# Patient Record
Sex: MALE | Race: WHITE | Employment: FULL TIME | URBAN - METROPOLITAN AREA
[De-identification: names, ages, dates, MRNs, and addresses within clinical notes are randomized per-mention and may not be internally consistent; named-entity substitution may affect disease eponyms.]

---

## 2019-02-26 LAB
ALBUMIN SERPL-MCNC: 4.4 G/DL
ALP BLD-CCNC: 94 U/L
ALT SERPL-CCNC: 35 U/L
ANION GAP SERPL CALCULATED.3IONS-SCNC: ABNORMAL MMOL/L
AST SERPL-CCNC: 18 U/L
BASOPHILS ABSOLUTE: 20 /ΜL
BASOPHILS RELATIVE PERCENT: 0 %
BILIRUB SERPL-MCNC: 0.7 MG/DL (ref 0.1–1.4)
BUN BLDV-MCNC: 12 MG/DL
CALCIUM SERPL-MCNC: 9.5 MG/DL
CHLORIDE BLD-SCNC: 106 MMOL/L
CHOLESTEROL, TOTAL: 208 MG/DL
CHOLESTEROL/HDL RATIO: 5.3
CO2: 29 MMOL/L
CREAT SERPL-MCNC: 0.85 MG/DL
EOSINOPHILS ABSOLUTE: 100 /ΜL
EOSINOPHILS RELATIVE PERCENT: 2 %
GFR CALCULATED: 125
GLUCOSE BLD-MCNC: 105 MG/DL
HCT VFR BLD CALC: 43.7 % (ref 41–53)
HDLC SERPL-MCNC: 39 MG/DL (ref 35–70)
HEMOGLOBIN: 14.6 G/DL (ref 13.5–17.5)
LDL CHOLESTEROL CALCULATED: 138 MG/DL (ref 0–160)
LYMPHOCYTES ABSOLUTE: 2050 /ΜL
LYMPHOCYTES RELATIVE PERCENT: 41 %
MCH RBC QN AUTO: 29.7 PG
MCHC RBC AUTO-ENTMCNC: 33.4 G/DL
MCV RBC AUTO: 88.9 FL
MONOCYTES ABSOLUTE: 430 /ΜL
MONOCYTES RELATIVE PERCENT: 9 %
NEUTROPHILS ABSOLUTE: 2340 /ΜL
NEUTROPHILS RELATIVE PERCENT: 47 %
PLATELET # BLD: 328 K/ΜL
PMV BLD AUTO: 8.3 FL
POTASSIUM SERPL-SCNC: 4.2 MMOL/L
RBC # BLD: 4.91 10^6/ΜL
SODIUM BLD-SCNC: 140 MMOL/L
TOTAL PROTEIN: 7
TRIGL SERPL-MCNC: 177 MG/DL
TSH SERPL DL<=0.05 MIU/L-ACNC: 0.76 UIU/ML
VLDLC SERPL CALC-MCNC: 168 MG/DL
WBC # BLD: 4.9 10^3/ML

## 2019-05-21 ENCOUNTER — TELEPHONE (OUTPATIENT)
Dept: PRIMARY CARE CLINIC | Age: 24
End: 2019-05-21

## 2019-05-21 NOTE — TELEPHONE ENCOUNTER
Mom called stating Vinckillian epi pen rx is  and would like it called into the pharmacy due to him being outside working.  cb number: [phone]

## 2019-05-24 RX ORDER — EPINEPHRINE 0.3 MG/.3ML
INJECTION SUBCUTANEOUS
Qty: 1 EACH | Refills: 1 | Status: SHIPPED
Start: 2019-05-24 | End: 2020-04-29 | Stop reason: SDUPTHER

## 2019-10-07 ENCOUNTER — OFFICE VISIT (OUTPATIENT)
Dept: PRIMARY CARE CLINIC | Age: 24
End: 2019-10-07
Payer: COMMERCIAL

## 2019-10-07 VITALS
OXYGEN SATURATION: 98 % | BODY MASS INDEX: 33.88 KG/M2 | WEIGHT: 242 LBS | HEIGHT: 71 IN | DIASTOLIC BLOOD PRESSURE: 80 MMHG | SYSTOLIC BLOOD PRESSURE: 128 MMHG | HEART RATE: 84 BPM

## 2019-10-07 DIAGNOSIS — E78.2 MIXED HYPERLIPIDEMIA: ICD-10-CM

## 2019-10-07 DIAGNOSIS — F34.1 DYSTHYMIA: Primary | ICD-10-CM

## 2019-10-07 DIAGNOSIS — Z00.00 HEALTH MAINTENANCE EXAMINATION: ICD-10-CM

## 2019-10-07 PROCEDURE — 90471 IMMUNIZATION ADMIN: CPT | Performed by: FAMILY MEDICINE

## 2019-10-07 PROCEDURE — G8427 DOCREV CUR MEDS BY ELIG CLIN: HCPCS | Performed by: FAMILY MEDICINE

## 2019-10-07 PROCEDURE — 99214 OFFICE O/P EST MOD 30 MIN: CPT | Performed by: FAMILY MEDICINE

## 2019-10-07 PROCEDURE — 90651 9VHPV VACCINE 2/3 DOSE IM: CPT | Performed by: FAMILY MEDICINE

## 2019-10-07 PROCEDURE — G8484 FLU IMMUNIZE NO ADMIN: HCPCS | Performed by: FAMILY MEDICINE

## 2019-10-07 PROCEDURE — 1036F TOBACCO NON-USER: CPT | Performed by: FAMILY MEDICINE

## 2019-10-07 PROCEDURE — G8417 CALC BMI ABV UP PARAM F/U: HCPCS | Performed by: FAMILY MEDICINE

## 2019-10-07 ASSESSMENT — PATIENT HEALTH QUESTIONNAIRE - PHQ9: DEPRESSION UNABLE TO ASSESS: PT REFUSES

## 2019-11-06 PROBLEM — Z00.00 HEALTH MAINTENANCE EXAMINATION: Status: RESOLVED | Noted: 2019-10-07 | Resolved: 2019-11-06

## 2020-03-31 RX ORDER — CETIRIZINE HYDROCHLORIDE 10 MG/1
10 TABLET ORAL DAILY PRN
Qty: 30 TABLET | Refills: 3 | Status: SHIPPED
Start: 2020-03-31 | End: 2020-09-18 | Stop reason: SDUPTHER

## 2020-03-31 RX ORDER — CETIRIZINE HYDROCHLORIDE 10 MG/1
10 TABLET ORAL DAILY
COMMUNITY
End: 2020-03-31 | Stop reason: SDUPTHER

## 2020-04-29 RX ORDER — EPINEPHRINE 0.3 MG/.3ML
INJECTION SUBCUTANEOUS
Qty: 1 EACH | Refills: 1 | Status: SHIPPED
Start: 2020-04-29 | End: 2022-04-14 | Stop reason: SDUPTHER

## 2020-04-29 NOTE — TELEPHONE ENCOUNTER
Requesting refill  Verified medication info with pt, he needs a refill because his other pens     Last Appointment:  10/7/2019    Future appts:  Future Appointments   Date Time Provider Amena Medina   2020  1:30 PM MD [facility]

## 2020-06-10 ENCOUNTER — OFFICE VISIT (OUTPATIENT)
Dept: PRIMARY CARE CLINIC | Age: 25
End: 2020-06-10
Payer: COMMERCIAL

## 2020-06-10 VITALS
SYSTOLIC BLOOD PRESSURE: 132 MMHG | DIASTOLIC BLOOD PRESSURE: 80 MMHG | WEIGHT: 250 LBS | HEART RATE: 76 BPM | BODY MASS INDEX: 34.87 KG/M2 | TEMPERATURE: 97.6 F

## 2020-06-10 PROCEDURE — 1036F TOBACCO NON-USER: CPT | Performed by: FAMILY MEDICINE

## 2020-06-10 PROCEDURE — 99214 OFFICE O/P EST MOD 30 MIN: CPT | Performed by: FAMILY MEDICINE

## 2020-06-10 PROCEDURE — G8417 CALC BMI ABV UP PARAM F/U: HCPCS | Performed by: FAMILY MEDICINE

## 2020-06-10 PROCEDURE — G8427 DOCREV CUR MEDS BY ELIG CLIN: HCPCS | Performed by: FAMILY MEDICINE

## 2020-06-10 NOTE — PROGRESS NOTES
10/7/19  Suzi Henriquez : 1995 Sex: male  Age: 27 y.o. Chief Complaint   Patient presents with    Medication Refill     Zoloft, general check up       HPI  HPI:    Patient presents today for follow-up and a refill of Zoloft. Overall doing well. His chronic anxiety is much better controlled. Denies depression. Adamantly denies SI/HI. However he feels coworkers do not pull their weight and he feels like he has a short fuse and is angered easily. Did not get labs yet    Review of Systems  ROS:  Const: Denies chills, fever, malaise and sweats. Eyes: Denies discharge, pain, redness and visual disturbance. ENMT: Denies earaches, other ear symptoms. Denies nasal or sinus symptoms other than stated  above. Denies mouth and tongue lesions and sore throat. CV: Denies chest discomfort, pain; diaphoresis, dizziness, edema, lightheadedness, orthopnea,  palpitations, syncope and near syncopal episode or any exertional symptoms  Resp: Denies cough, hemoptysis, pleuritic pain, SOB, sputum production and wheezing. GI: Denies abdominal pain, change in bowel habits, hematochezia, melena, nausea and vomiting. : Denies urinary symptoms including dysuria , urgency, frequency or hematuria. Musculo: Denies musculoskeletal symptoms. Skin: Denies bruising and rash.   Neuro: Denies headache, numbness, stiff neck, tingling and focal weakness slurred speech or facial  droop  Hema/Lymph: Denies bleeding/bruising tendency and enlarged lymph nodes        Current Outpatient Medications:     sertraline (ZOLOFT) 50 MG tablet, Take 1.5 tablets by mouth daily, Disp: 45 tablet, Rfl: 1    EPINEPHrine (EPIPEN 2-KEREN) 0.3 MG/0.3ML SOAJ injection, Inject thigh as directed as needed anaphylaxis, may repeat if needed, Disp: 1 each, Rfl: 1    cetirizine (ZYRTEC) 10 MG tablet, Take 1 tablet by mouth daily as needed for Allergies, Disp: 30 tablet, Rfl: 3    Multiple Vitamin (MULTI-VITAMIN DAILY PO), Take 1 tablet by mouth daily, counseling, declines. R  We did discuss trying PRN hydroxyzine but he wants to hold off now. Risk benefits reviewed including sedation not to drive or operate machinery. Paradoxical effects reviewed as well.     Health maintenance examination  Health maintenance issues discussed at length 2/19. Encouraged yearly physicals. He will schedule this in about a month sooner as needed    Mixed hyperlipidemia  counseled. not at goal risk of hyperlipidemia reviewed lifestyle modification reviewed. Not interested in pharmacotherapy. [place] feb    No flowsheet data found. Plan as above. Counseled extensively and differential diagnoses relevant to above were reviewed, including serious etiologies. Side effects and interactions of medications were reviewed. At this point simply wants to proceed as above get blood work and follow-up for physical in about 1 month and for medication check, sooner as needed        As long as symptoms steadily improve/resolve, and medical conditions follow the expected course, FU as below, sooner PRN. Return in about 1 month (around 7/10/2020), or if symptoms worsen or fail to improve, for physical.         Signs and symptoms to watch for discussed, serious signs and symptoms reviewed. ER if any. Pratima Ambrosio MD    Patients are advised to check with insurance company to ensure coverage and to fully understand benefits and cost prior to any testing. This note was created with the assistance of voice recognition software. Document was reviewed however may contain grammatical errors.   Mixed hyperlipidemia

## 2020-07-02 LAB
ALBUMIN SERPL-MCNC: 4.4 G/DL
ALP BLD-CCNC: 76 U/L
ALT SERPL-CCNC: 1.9 U/L
ANION GAP SERPL CALCULATED.3IONS-SCNC: NORMAL MMOL/L
AST SERPL-CCNC: 46 U/L
BASOPHILS ABSOLUTE: 29 /ΜL
BASOPHILS RELATIVE PERCENT: 0.4 %
BILIRUB SERPL-MCNC: 0.4 MG/DL (ref 0.1–1.4)
BUN BLDV-MCNC: 12 MG/DL
CALCIUM SERPL-MCNC: 9.5 MG/DL
CHLORIDE BLD-SCNC: 104 MMOL/L
CHOLESTEROL, TOTAL: 218 MG/DL
CHOLESTEROL/HDL RATIO: 6.1
CO2: 25 MMOL/L
CREAT SERPL-MCNC: 0.88 MG/DL
EOSINOPHILS ABSOLUTE: 197 /ΜL
EOSINOPHILS RELATIVE PERCENT: 2.7 %
GFR CALCULATED: NORMAL
GLUCOSE BLD-MCNC: 98 MG/DL
HCT VFR BLD CALC: 42.5 % (ref 41–53)
HDLC SERPL-MCNC: 36 MG/DL (ref 35–70)
HEMOGLOBIN: 15 G/DL (ref 13.5–17.5)
LDL CHOLESTEROL CALCULATED: ABNORMAL
LYMPHOCYTES ABSOLUTE: 2643 /ΜL
LYMPHOCYTES RELATIVE PERCENT: 36.2 %
MCH RBC QN AUTO: 30.9 PG
MCHC RBC AUTO-ENTMCNC: 35.3 G/DL
MCV RBC AUTO: 87.6 FL
MONOCYTES ABSOLUTE: 715 /ΜL
MONOCYTES RELATIVE PERCENT: 9.8 %
NEUTROPHILS ABSOLUTE: 3716 /ΜL
NEUTROPHILS RELATIVE PERCENT: 50.9 %
PDW BLD-RTO: 12.6 %
PLATELET # BLD: 269 K/ΜL
PMV BLD AUTO: 10.2 FL
POTASSIUM SERPL-SCNC: 3.8 MMOL/L
RBC # BLD: 4.85 10^6/ΜL
SODIUM BLD-SCNC: 140 MMOL/L
TOTAL PROTEIN: 7
TRIGL SERPL-MCNC: 417 MG/DL
TSH SERPL DL<=0.05 MIU/L-ACNC: 1.9 UIU/ML
VLDLC SERPL CALC-MCNC: ABNORMAL MG/DL
WBC # BLD: 7.3 10^3/ML

## 2020-07-10 PROBLEM — Z00.00 HEALTH MAINTENANCE EXAMINATION: Status: RESOLVED | Noted: 2019-10-07 | Resolved: 2020-07-10

## 2020-07-17 ENCOUNTER — OFFICE VISIT (OUTPATIENT)
Dept: PRIMARY CARE CLINIC | Age: 25
End: 2020-07-17
Payer: COMMERCIAL

## 2020-07-17 VITALS
WEIGHT: 255 LBS | OXYGEN SATURATION: 98 % | TEMPERATURE: 98.3 F | BODY MASS INDEX: 35.57 KG/M2 | DIASTOLIC BLOOD PRESSURE: 80 MMHG | HEART RATE: 69 BPM | SYSTOLIC BLOOD PRESSURE: 136 MMHG

## 2020-07-17 PROCEDURE — G8417 CALC BMI ABV UP PARAM F/U: HCPCS | Performed by: FAMILY MEDICINE

## 2020-07-17 PROCEDURE — G8427 DOCREV CUR MEDS BY ELIG CLIN: HCPCS | Performed by: FAMILY MEDICINE

## 2020-07-17 PROCEDURE — 99214 OFFICE O/P EST MOD 30 MIN: CPT | Performed by: FAMILY MEDICINE

## 2020-07-17 PROCEDURE — 1036F TOBACCO NON-USER: CPT | Performed by: FAMILY MEDICINE

## 2020-07-17 NOTE — PROGRESS NOTES
[id number]  MAGNESIUM  No results found for: MG   PHOS  No results found for: PHOS   FELIX   No results found for: FELIX  RHEUMATOID FACTOR  No results found for: RF  PSA  No results found for: PSA   HEPATITIS C  No results found for: HCVABI  HIV  No results found for: EKL9HQD, HIV1QT  UA  No results found for: COLORU, CLARITYU, GLUCOSEU, BILIRUBINUR, KETUA, SPECGRAV, BLOODU, PHUR, PROTEINU, UROBILINOGEN, NITRU, LEUKOCYTESUR  Urine Micro/Albumin Ratio  No results found for: MALBCR        Review of Systems  ROS:  Const: Denies chills, fever, malaise and sweats. Eyes: Denies discharge, pain, redness and visual disturbance. ENMT: Denies earaches, other ear symptoms. Denies nasal or sinus symptoms other than stated  above. Denies mouth and tongue lesions and sore throat. CV: Denies chest discomfort, pain; diaphoresis, dizziness, edema, lightheadedness, orthopnea,  palpitations, syncope and near syncopal episode or any exertional symptoms  Resp: Denies cough, hemoptysis, pleuritic pain, SOB, sputum production and wheezing. GI: Denies abdominal pain, change in bowel habits, hematochezia, melena, nausea and vomiting. : Denies urinary symptoms including dysuria , urgency, frequency or hematuria. Musculo: Denies musculoskeletal symptoms. Skin: Denies bruising and rash.   Neuro: Denies headache, numbness, stiff neck, tingling and focal weakness slurred speech or facial  droop  Hema/Lymph: Denies bleeding/bruising tendency and enlarged lymph nodes        Current Outpatient Medications:     sertraline (ZOLOFT) 50 MG tablet, Take 1.5 tablets by mouth daily, Disp: 45 tablet, Rfl: 3    EPINEPHrine (EPIPEN 2-KEREN) 0.3 MG/0.3ML SOAJ injection, Inject thigh as directed as needed anaphylaxis, may repeat if needed, Disp: 1 each, Rfl: 1    cetirizine (ZYRTEC) 10 MG tablet, Take 1 tablet by mouth daily as needed for Allergies, Disp: 30 tablet, Rfl: 3    Multiple Vitamin (MULTI-VITAMIN DAILY PO), Take 1 tablet by mouth daily, Disp: , No erythema or exudate. Posterior pharynx is normal.  Neck: Supple. Palpation reveals no adenopathy. No masses appreciated. No JVD. Carotids: no  bruits. Resp: Respirations are unlabored. Clear to auscultation. No rales, rhonchi or wheezes appreciated  over the lungs bilaterally. CV: Rate is regular. Rhythm is regular. No gallop or rubs. No heart murmur appreciated. Extremities: No clubbing, cyanosis, or edema. No calf inflammation or tenderness. Abdomen: Bowel sounds are normoactive. Abdomen is soft, nontender, and nondistended. No  abdominal masses. No palpable hepatosplenomegaly. Lymph: No palpable or visible regional lymphadenopathy. Musculoskeletal: no acute joint inflammation. Skin: Dry and warm with no rash. Skin normal to inspection and palpation overall. Neuro: Alert and oriented. Affect: appropriate. Upper Extremities: 5/5 bilaterally. Lower Extremities:  5/5 bilaterally. Sensation intact to light touch. Reflexes: DTR's are symmetric and 2+ bilaterally. .  Cranial Nerves: Cranial nerves grossly intact. Assessment and Plan:   Diagnosis Orders   1. Mixed hyperlipidemia  Comprehensive Metabolic Panel    Lipid Panel   2. Dysthymia  sertraline (ZOLOFT) 50 MG tablet   3. Health maintenance examination         No problem-specific Assessment & Plan notes found for this encounter. Dysthymia  Chronic, previously saw Dr. María Elena Mosqueda and was on Zoloft and Vyvanse. Dr. María Elena Mosqueda turned him over to  \"adult care\". He was on no medications for a while, developed depression again. Zoloft was titrated up to 75 mg daily and doing very well at this dose. Tolerating well. . Declines psychiatry now unless symptoms persist or worsen. Adamantly  denies suicidal or homicidal ideations . recommend counseling, declines now. We did discuss  PRN hydroxyzine if needed but doing well now he states. Risk benefits reviewed including sedation not to drive or operate machinery.   Paradoxical effects reviewed as well.     Health

## 2020-08-16 PROBLEM — Z00.00 HEALTH MAINTENANCE EXAMINATION: Status: RESOLVED | Noted: 2019-10-07 | Resolved: 2020-08-16

## 2020-09-18 RX ORDER — CETIRIZINE HYDROCHLORIDE 10 MG/1
10 TABLET ORAL DAILY PRN
Qty: 90 TABLET | Refills: 3 | Status: SHIPPED
Start: 2020-09-18 | End: 2021-04-19 | Stop reason: SDUPTHER

## 2020-10-19 ENCOUNTER — OFFICE VISIT (OUTPATIENT)
Dept: PRIMARY CARE CLINIC | Age: 25
End: 2020-10-19
Payer: COMMERCIAL

## 2020-10-19 VITALS
SYSTOLIC BLOOD PRESSURE: 128 MMHG | HEART RATE: 83 BPM | OXYGEN SATURATION: 98 % | WEIGHT: 258 LBS | BODY MASS INDEX: 35.98 KG/M2 | DIASTOLIC BLOOD PRESSURE: 70 MMHG | TEMPERATURE: 97.8 F

## 2020-10-19 PROCEDURE — 1036F TOBACCO NON-USER: CPT | Performed by: FAMILY MEDICINE

## 2020-10-19 PROCEDURE — 99214 OFFICE O/P EST MOD 30 MIN: CPT | Performed by: FAMILY MEDICINE

## 2020-10-19 PROCEDURE — G8427 DOCREV CUR MEDS BY ELIG CLIN: HCPCS | Performed by: FAMILY MEDICINE

## 2020-10-19 PROCEDURE — G8484 FLU IMMUNIZE NO ADMIN: HCPCS | Performed by: FAMILY MEDICINE

## 2020-10-19 PROCEDURE — G8417 CALC BMI ABV UP PARAM F/U: HCPCS | Performed by: FAMILY MEDICINE

## 2020-10-19 RX ORDER — SERTRALINE HYDROCHLORIDE 100 MG/1
100 TABLET, FILM COATED ORAL DAILY
Qty: 30 TABLET | Refills: 3 | Status: SHIPPED
Start: 2020-10-19 | End: 2021-01-25 | Stop reason: SDUPTHER

## 2020-10-19 NOTE — PROGRESS NOTES
10/7/19  Alvy Overall : 1995 Sex: male  Age: 27 y.o. Chief Complaint   Patient presents with    3 Month Follow-Up       HPI  HPI:      Patient presents today for follow-up on anxiety medication. Here with his fimonicae. Has been taking Zoloft 100 mg daily doing well.   Most Recent Labs  CBC  Lab Results   Component Value Date    WBC 7.3 2020    WBC 4.9 2019    RBC 4.85 2020    RBC 4.91 2019    HGB 15.0 2020    HGB 14.6 2019    HCT 42.5 2020    HCT 43.7 2019    MCV 87.6 2020    MCV 88.9 2019     2020     2019      CMP  Lab Results   Component Value Date     2020     2019    K 3.8 2020    K 4.2 2019     2020     2019    CO2 25 2020    CO2 29 2019    GLUCOSE 98 2020    GLUCOSE 105 2019    BUN 12 2020    BUN 12 2019    CREATININE 0.88 2020    CREATININE 0.85 2019    LABGLOM 125 2019    CALCIUM 9.5 2020    CALCIUM 9.5 2019    LABALBU 4.4 2020    LABALBU 4.4 2019    BILITOT 0.4 2020    BILITOT 0.7 2019    ALKPHOS 76 2020    ALKPHOS 94 2019    AST 46 2020    AST 18 2019    ALT 1.90 2020    ALT 35 2019     A1C  No results found for: LABA1C  TSH  Lab Results   Component Value Date    TSH 1.90 2020    TSH 0.76 2019     FREET4  No results found for: F1TMZFC  LIPID  Lab Results   Component Value Date    CHOL 218 2020    CHOL 208 2019    HDL 36 2020    HDL 39 2019    LDLCALC 138 2019    TRIG 417 2020    TRIG 177 2019    CHOLHDLRATIO 6.1 2020    CHOLHDLRATIO 5.3 2019    VLDL 168 2019     VITAMIN D  No results found for: VITD25  MAGNESIUM  No results found for: MG   PHOS  No results found for: PHOS   FELIX   No results found for: FELIX  RHEUMATOID FACTOR  No results found for: RF  PSA  No results found for: PSA   HEPATITIS C  No results found for: HCVABI  HIV  No results found for: ILF6KFM, HIV1QT  UA  No results found for: COLORU, CLARITYU, GLUCOSEU, BILIRUBINUR, KETUA, SPECGRAV, BLOODU, PHUR, PROTEINU, UROBILINOGEN, NITRU, LEUKOCYTESUR  Urine Micro/Albumin Ratio  No results found for: MALBCR        Review of Systems  ROS:  Const: Denies chills, fever, malaise and sweats. Eyes: Denies discharge, pain, redness and visual disturbance. ENMT: Denies earaches, other ear symptoms. Denies nasal or sinus symptoms other than stated  above. Denies mouth and tongue lesions and sore throat. CV: Denies chest discomfort, pain; diaphoresis, dizziness, edema, lightheadedness, orthopnea,  palpitations, syncope and near syncopal episode or any exertional symptoms  Resp: Denies cough, hemoptysis, pleuritic pain, SOB, sputum production and wheezing. GI: Denies abdominal pain, change in bowel habits, hematochezia, melena, nausea and vomiting. : Denies urinary symptoms including dysuria , urgency, frequency or hematuria. Musculo: Denies musculoskeletal symptoms. Skin: Denies bruising and rash.   Neuro: Denies headache, numbness, stiff neck, tingling and focal weakness slurred speech or facial  droop  Hema/Lymph: Denies bleeding/bruising tendency and enlarged lymph nodes        Current Outpatient Medications:     sertraline (ZOLOFT) 100 MG tablet, Take 1 tablet by mouth daily, Disp: 30 tablet, Rfl: 3    cetirizine (ZYRTEC) 10 MG tablet, Take 1 tablet by mouth daily as needed for Allergies, Disp: 90 tablet, Rfl: 3    EPINEPHrine (EPIPEN 2-KEREN) 0.3 MG/0.3ML SOAJ injection, Inject thigh as directed as needed anaphylaxis, may repeat if needed, Disp: 1 each, Rfl: 1    Multiple Vitamin (MULTI-VITAMIN DAILY PO), Take 1 tablet by mouth daily, Disp: , Rfl:   Allergies   Allergen Reactions    Bee Venom     Cephalexin Swelling       Past Medical History:   Diagnosis Date    Acne     Attention/concentration deficit, non-ADHD     Depression      Past Surgical History:   Procedure Laterality Date    REL OF TONGUE TIE AND CLOSURE WITH FLAP      TOE SURGERY      TONSILLECTOMY      TONSILLECTOMY AND ADENOIDECTOMY      TYMPANOSTOMY TUBE PLACEMENT      WISDOM TOOTH EXTRACTION       Family History   Problem Relation Age of Onset    Sudden Death Neg Hx      Social History     Tobacco Use    Smoking status: Never Smoker    Smokeless tobacco: Never Used   Substance Use Topics    Alcohol use: No    Drug use: No      Social History     Social History Narrative    PMH:    Health Maintenance:    Influenza Vaccination - (2015)    Tetanus Immunization - (2015)    Medical Problems:    Add    Acne - follows with [facility] dermat[facility]    depression    Surgical Hx:    T & A, Tympanostomy Tube Insertion, Fre[place] Tooth Extraction    Reviewed, no changes. FH:    Father:    . (Hx)    Mother:    . (Hx)    Denies CM, sudden death, congenital or otherwise    [facility]  1995 Page #2    Reviewed, no changes. SH:    . (Marital)    Works [employer] for  of [address]: Cigarette Use: Nonsmoker - no smokers at home        Vitals:    10/19/20 1312   BP: 128/70   Pulse: 83   Temp: 97.8 °F (36.6 °C)   SpO2: 98%   Weight: 258 lb (117 kg)      Wt Readings from Last 3 Encounters:   10/19/20 258 lb (117 kg)   20 255 lb (115.7 kg)   06/10/20 250 lb (113.4 kg)        Physical Exam  Exam:  Const: Appears comfortable. No signs of acute distress present. Head/Face: Atraumatic on inspection. Eyes: EOMI in both eyes. No discharge from the eyes. PERRL. Sclerae clear. ENMT: Auditory canals normal. Tympanic membranes: intact and translucent. External nose WNL. Nasal mucosa is clear. Oropharynx: No erythema or exudate. Posterior pharynx is normal.  Neck: Supple. Palpation reveals no adenopathy. No masses appreciated. No JVD. Carotids: no  bruits. Resp: Respirations are unlabored. Clear to auscultation. No rales, rhonchi or wheezes appreciated  over the lungs bilaterally. CV: Rate is regular. Rhythm is regular. No gallop or rubs. No heart murmur appreciated. Extremities: No clubbing, cyanosis, or edema. No calf inflammation or tenderness. Abdomen: Bowel sounds are normoactive. Abdomen is soft, nontender, and nondistended. No  abdominal masses. No palpable hepatosplenomegaly. Lymph: No palpable or visible regional lymphadenopathy. Musculoskeletal: no acute joint inflammation. Skin: Dry and warm with no rash. Skin normal to inspection and palpation overall. Neuro: Alert and oriented. Affect: appropriate. Upper Extremities: 5/5 bilaterally. Lower Extremities:  5/5 bilaterally. Sensation intact to light touch. Reflexes: DTR's are symmetric and 2+ bilaterally. .  Cranial Nerves: Cranial nerves grossly intact. Assessment and Plan:   Diagnosis Orders   1. Dysthymia  sertraline (ZOLOFT) 100 MG tablet   2. Mixed hyperlipidemia     3. Health maintenance examination         No problem-specific Assessment & Plan notes found for this encounter. Dysthymia  Chronic, previously saw Dr. Jersey Ames and was on Zoloft and Vyvanse. Dr. Jersey Ames turned him over to  \"adult care\". He was on no medications for a while, developed depression again. Zoloft was titrated up to 75 mg daily, he ultimately increased on his own to 100 mg and doing very well at this dose. Tolerating well. . Declines psychiatry now unless symptoms persist or worsen. Adamantly  denies suicidal or homicidal ideations . recommend counseling, declines now. We did discuss  PRN hydroxyzine if needed but doing well now he states. Risk benefits reviewed including sedation not to drive or operate machinery. Paradoxical effects reviewed as well.     Health maintenance examination  Health maintenance issues discussed at length 2/19. Encouraged yearly physicals.    Had flu vaccine

## 2021-01-21 ENCOUNTER — HOSPITAL ENCOUNTER (OUTPATIENT)
Age: 26
Discharge: HOME | End: 2021-01-21
Payer: COMMERCIAL

## 2021-01-21 LAB
ALBUMIN SERPL-MCNC: 4.7 G/DL (ref 3.5–5.2)
ALP BLD-CCNC: 78 U/L (ref 40–129)
ALT SERPL-CCNC: 62 U/L (ref 0–40)
ANION GAP SERPL CALCULATED.3IONS-SCNC: 10 MMOL/L (ref 7–16)
AST SERPL-CCNC: 31 U/L (ref 0–39)
BILIRUB SERPL-MCNC: 0.6 MG/DL (ref 0–1.2)
BUN BLDV-MCNC: 13 MG/DL (ref 6–20)
CALCIUM SERPL-MCNC: 9.8 MG/DL (ref 8.6–10.2)
CHLORIDE BLD-SCNC: 100 MMOL/L (ref 98–107)
CHOLESTEROL, TOTAL: 202 MG/DL (ref 0–199)
CO2: 29 MMOL/L (ref 22–29)
CREAT SERPL-MCNC: 0.9 MG/DL (ref 0.7–1.2)
GFR AFRICAN AMERICAN: >60
GFR NON-AFRICAN AMERICAN: >60 ML/MIN/1.73
GLUCOSE BLD-MCNC: 99 MG/DL (ref 74–99)
HDLC SERPL-MCNC: 43 MG/DL
LDL CHOLESTEROL CALCULATED: 106 MG/DL (ref 0–99)
POTASSIUM SERPL-SCNC: 4.3 MMOL/L (ref 3.5–5)
SODIUM BLD-SCNC: 139 MMOL/L (ref 132–146)
TOTAL PROTEIN: 7.9 G/DL (ref 6.4–8.3)
TRIGL SERPL-MCNC: 264 MG/DL (ref 0–149)
VLDLC SERPL CALC-MCNC: 53 MG/DL

## 2021-01-21 PROCEDURE — 36415 COLL VENOUS BLD VENIPUNCTURE: CPT

## 2021-01-21 PROCEDURE — 80061 LIPID PANEL: CPT

## 2021-01-21 PROCEDURE — 80053 COMPREHEN METABOLIC PANEL: CPT

## 2021-01-25 ENCOUNTER — OFFICE VISIT (OUTPATIENT)
Dept: PRIMARY CARE CLINIC | Age: 26
End: 2021-01-25
Payer: COMMERCIAL

## 2021-01-25 VITALS
HEART RATE: 66 BPM | SYSTOLIC BLOOD PRESSURE: 128 MMHG | WEIGHT: 268 LBS | BODY MASS INDEX: 37.38 KG/M2 | TEMPERATURE: 97.8 F | OXYGEN SATURATION: 95 % | DIASTOLIC BLOOD PRESSURE: 70 MMHG

## 2021-01-25 DIAGNOSIS — R21 RASH: ICD-10-CM

## 2021-01-25 DIAGNOSIS — F34.1 DYSTHYMIA: Primary | ICD-10-CM

## 2021-01-25 DIAGNOSIS — R79.89 ELEVATED LFTS: ICD-10-CM

## 2021-01-25 DIAGNOSIS — E78.2 MIXED HYPERLIPIDEMIA: ICD-10-CM

## 2021-01-25 PROCEDURE — G8427 DOCREV CUR MEDS BY ELIG CLIN: HCPCS | Performed by: FAMILY MEDICINE

## 2021-01-25 PROCEDURE — G8484 FLU IMMUNIZE NO ADMIN: HCPCS | Performed by: FAMILY MEDICINE

## 2021-01-25 PROCEDURE — 99214 OFFICE O/P EST MOD 30 MIN: CPT | Performed by: FAMILY MEDICINE

## 2021-01-25 PROCEDURE — G8417 CALC BMI ABV UP PARAM F/U: HCPCS | Performed by: FAMILY MEDICINE

## 2021-01-25 PROCEDURE — 1036F TOBACCO NON-USER: CPT | Performed by: FAMILY MEDICINE

## 2021-01-25 RX ORDER — SERTRALINE HYDROCHLORIDE 100 MG/1
100 TABLET, FILM COATED ORAL DAILY
Qty: 30 TABLET | Refills: 3 | Status: SHIPPED
Start: 2021-01-25 | End: 2021-08-18

## 2021-01-25 RX ORDER — AMMONIUM LACTATE 12 G/100G
LOTION TOPICAL
Qty: 225 G | Refills: 3 | Status: SHIPPED
Start: 2021-01-25 | End: 2021-04-19

## 2021-01-25 SDOH — ECONOMIC STABILITY: FOOD INSECURITY: WITHIN THE PAST 12 MONTHS, THE FOOD YOU BOUGHT JUST DIDN'T LAST AND YOU DIDN'T HAVE MONEY TO GET MORE.: PATIENT DECLINED

## 2021-01-25 SDOH — ECONOMIC STABILITY: INCOME INSECURITY: HOW HARD IS IT FOR YOU TO PAY FOR THE VERY BASICS LIKE FOOD, HOUSING, MEDICAL CARE, AND HEATING?: PATIENT DECLINED

## 2021-01-25 NOTE — ASSESSMENT & PLAN NOTE
Likely fatty liver. Precautions reviewed. Risks of even fatty liver leading to cirrhosis reviewed. Lifestyle modification and appropriate diet and weight loss reviewed. Other than basic monitoring not interested in in-depth blood work imaging or otherwise other than hepatitis profile. Weight loss encouraged. Monitor. ASX.

## 2021-01-25 NOTE — PROGRESS NOTES
10/7/19  Sil Lara : 1995 Sex: male  Age: 23 y.o. Chief Complaint   Patient presents with    3 Month Follow-Up    Discuss Labs         HPI:      Patient presents today for follow-up anxiety and anger she is doing very well. He does complain of a chronic hand dermatitis. Uses harsh soaps. Also here to review blood work. Has had 10 pound weight gain. Counseled. Weight loss regimen reviewed, healthy lifestyle modification       Blood work reviewed, ALT became elevated at 62, AST 61,  HDL 43 triglycerides went down from 417-264.   most Recent Labs  CBC  Lab Results   Component Value Date    WBC 7.3 2020    WBC 4.9 2019    RBC 4.85 2020    RBC 4.91 2019    HGB 15.0 2020    HGB 14.6 2019    HCT 42.5 2020    HCT 43.7 2019    MCV 87.6 2020    MCV 88.9 2019     2020     2019      CMP  Lab Results   Component Value Date     2021     2020     2019    K 4.3 2021    K 3.8 2020    K 4.2 2019     2021     2020     2019    CO2 29 2021    CO2 25 2020    CO2 29 2019    ANIONGAP 10 2021    GLUCOSE 99 2021    GLUCOSE 98 2020    GLUCOSE 105 2019    BUN 13 2021    BUN 12 2020    BUN 12 2019    CREATININE 0.9 2021    CREATININE 0.88 2020    CREATININE 0.85 2019    LABGLOM >60 2021    LABGLOM 125 2019    GFRAA >60 2021    CALCIUM 9.8 2021    CALCIUM 9.5 2020    CALCIUM 9.5 2019    PROT 7.9 2021    LABALBU 4.7 2021    LABALBU 4.4 2020    LABALBU 4.4 2019    BILITOT 0.6 2021    BILITOT 0.4 2020    BILITOT 0.7 2019    ALKPHOS 78 2021    ALKPHOS 76 2020    ALKPHOS 94 2019    AST 31 2021    AST 46 2020    AST 18 2019    ALT 62 2021    ALT 1.90 07/02/2020    ALT 35 02/26/2019     A1C  No results found for: LABA1C  TSH  Lab Results   Component Value Date    TSH 1.90 07/02/2020    TSH 0.76 02/26/2019     FREET4  No results found for: D7SEEUT  LIPID  Lab Results   Component Value Date    CHOL 202 01/21/2021    CHOL 218 07/02/2020    CHOL 208 02/26/2019    HDL 43 01/21/2021    HDL 36 07/02/2020    HDL 39 02/26/2019    LDLCALC 106 01/21/2021    LDLCALC 138 02/26/2019    TRIG 264 01/21/2021    TRIG 417 07/02/2020    TRIG 177 02/26/2019    CHOLHDLRATIO 6.1 07/02/2020    CHOLHDLRATIO 5.3 02/26/2019    VLDL 168 02/26/2019     VITAMIN D  No results found for: VITD25  MAGNESIUM  No results found for: MG   PHOS  No results found for: PHOS   FELIX   No results found for: FELIX  RHEUMATOID FACTOR  No results found for: RF  PSA  No results found for: PSA   HEPATITIS C  No results found for: HCVABI  HIV  No results found for: OIZ7VMY, HIV1QT  UA  No results found for: COLORU, CLARITYU, GLUCOSEU, BILIRUBINUR, KETUA, SPECGRAV, BLOODU, PHUR, PROTEINU, UROBILINOGEN, NITRU, LEUKOCYTESUR  Urine Micro/Albumin Ratio  No results found for: MALBCR          ROS:  Const: Denies chills, fever, malaise and sweats. Eyes: Denies discharge, pain, redness and visual disturbance. ENMT: Denies earaches, other ear symptoms. Denies nasal or sinus symptoms other than stated  above. Denies mouth and tongue lesions and sore throat. CV: Denies chest discomfort, pain; diaphoresis, dizziness, edema, lightheadedness, orthopnea,  palpitations, syncope and near syncopal episode or any exertional symptoms  Resp: Denies cough, hemoptysis, pleuritic pain, SOB, sputum production and wheezing. GI: Denies abdominal pain, change in bowel habits, hematochezia, melena, nausea and vomiting. : Denies urinary symptoms including dysuria , urgency, frequency or hematuria. Musculo: Denies musculoskeletal symptoms.   Skin: Denies bruising, rash as above neuro: Denies headache, numbness, stiff neck, tingling and focal weakness slurred speech or facial  droop  Hema/Lymph: Denies bleeding/bruising tendency and enlarged lymph nodes        Current Outpatient Medications:     sertraline (ZOLOFT) 100 MG tablet, Take 1 tablet by mouth daily, Disp: 30 tablet, Rfl: 3    fluocinonide (LIDEX) 0.05 % cream, Apply topically up to 2 times daily prn x 2wks max. Avoid face/groin., Disp: 30 g, Rfl: 0    ammonium lactate (LAC-HYDRIN) 12 % lotion, Twice daily PRN indefinitely, Disp: 225 g, Rfl: 3    cetirizine (ZYRTEC) 10 MG tablet, Take 1 tablet by mouth daily as needed for Allergies, Disp: 90 tablet, Rfl: 3    EPINEPHrine (EPIPEN 2-KEREN) 0.3 MG/0.3ML SOAJ injection, Inject thigh as directed as needed anaphylaxis, may repeat if needed, Disp: 1 each, Rfl: 1    Multiple Vitamin (MULTI-VITAMIN DAILY PO), Take 1 tablet by mouth daily, Disp: , Rfl:   Allergies   Allergen Reactions    Bee Venom     Cephalexin Swelling       Past Medical History:   Diagnosis Date    Acne     Attention/concentration deficit, non-ADHD     Depression      Past Surgical History:   Procedure Laterality Date    REL OF TONGUE TIE AND CLOSURE WITH FLAP      TOE SURGERY      TONSILLECTOMY      TONSILLECTOMY AND ADENOIDECTOMY      TYMPANOSTOMY TUBE PLACEMENT      WISDOM TOOTH EXTRACTION       Family History   Problem Relation Age of Onset    Sudden Death Neg Hx      Social History     Tobacco Use    Smoking status: Never Smoker    Smokeless tobacco: Never Used   Substance Use Topics    Alcohol use: No    Drug use: No      Social History     Social History Narrative    PMH:    Health Maintenance:    Influenza Vaccination - (12/7/2015)    Tetanus Immunization - (12/7/2015)    Medical Problems:    Add    Acne - follows with advanced dermatology    depression    Surgical Hx:    T & A, Tympanostomy Tube Insertion, [place] Tooth Extraction    Reviewed, no changes. FH:    Father:    . (Hx)    Mother:    .  (Hx)    Denies CM, sudden death, congenital or tablet   2. Elevated LFTs  Hepatitis C Antibody    Hepatitis B Surface Antigen    Hepatitis B Surface Antibody    Hepatitis A Antibody, Total    Hepatitis A Antibody, IgM    Comprehensive Metabolic Panel   3. Mixed hyperlipidemia  Lipid Panel   4. Rash  fluocinonide (LIDEX) 0.05 % cream    ammonium lactate (LAC-HYDRIN) 12 % lotion       Elevated LFTs   Likely fatty liver. Precautions reviewed. Risks of even fatty liver leading to cirrhosis reviewed. Lifestyle modification and appropriate diet and weight loss reviewed. Other than basic monitoring not interested in in-depth blood work imaging or otherwise other than hepatitis profile. Weight loss encouraged. Monitor. ASX. Rash  Counseled extensively. Differential reviewed, including serious etiologies. Chronic hand dermatitis. Short-term use of Lidex with precautions and Lac-Hydrin as directed with precautions not to use on open skin. Notify if continues or worsens. Consider oral steroid if need be. Dysthymia  Chronic, previously saw Dr. Bernadette Li and was on Zoloft and Vyvanse. Dr. Bernadette Li turned him over to  \"adult care\". He was on no medications for a while, developed depression again. Zoloft was titrated up to 75 mg daily, he ultimately increased on his own to 100 mg and doing very well at this dose. Tolerating well. . Declines psychiatry now unless symptoms persist or worsen. Adamantly  denies suicidal or homicidal ideations . recommend counseling, declines now. We did discuss  PRN hydroxyzine if needed but doing well now he states. Risk benefits reviewed including sedation not to drive or operate machinery. Paradoxical effects reviewed as well.     Health maintenance examination  Health maintenance issues discussed at length 2/19. Encouraged yearly physicals. Had flu vaccine already. Mixed hyperlipidemia  counseled. Triglycerides improved but still elevated, admitted to eating a lot of fast food, but improved. , risk of hyperlipidemia reviewed lifestyle modification reviewed. Not interested in pharmacotherapy. Continue dietary modification and lifestyle modification/exercise reviewed. Recheck 3 months      Elevated LFTs   Likely fatty liver. Precautions reviewed. Risks of even fatty liver leading to cirrhosis reviewed. Lifestyle modification and appropriate diet and weight loss reviewed. Other than basic monitoring not interested in in-depth blood work imaging or otherwise other than hepatitis profile. Weight loss encouraged. Monitor. ASX. Rash  Counseled extensively. Differential reviewed, including serious etiologies. Chronic hand dermatitis. Short-term use of Lidex with precautions and Lac-Hydrin as directed with precautions not to use on open skin. Notify if continues or worsens. Consider oral steroid if need be. No flowsheet data found. Plan as above. Counseled extensively and differential diagnoses relevant to above were reviewed, including serious etiologies. Side effects and interactions of medications were reviewed. Continue current therapy, refills given, prescribed Lidex and Lac-Hydrin. Blood work follow-up 3 months sooner as needed  As long as symptoms steadily improve/resolve, and medical conditions follow the expected course, FU as below, sooner PRN. Return in about 3 months (around 4/25/2021), or if symptoms worsen or fail to improve. Signs and symptoms to watch for discussed, serious signs and symptoms reviewed. ER if any. Ino Bucio MD    Patients are advised to check with insurance company to ensure coverage and to fully understand benefits and cost prior to any testing. This note was created with the assistance of voice recognition software. Document was reviewed however may contain grammatical errors.   Mixed hyperlipidemia

## 2021-01-25 NOTE — ASSESSMENT & PLAN NOTE
Counseled extensively. Differential reviewed, including serious etiologies. Chronic hand dermatitis. Short-term use of Lidex with precautions and Lac-Hydrin as directed with precautions not to use on open skin. Notify if continues or worsens. Consider oral steroid if need be.

## 2021-04-15 DIAGNOSIS — R79.89 ELEVATED LFTS: ICD-10-CM

## 2021-04-15 DIAGNOSIS — E78.2 MIXED HYPERLIPIDEMIA: ICD-10-CM

## 2021-04-15 LAB
ALBUMIN SERPL-MCNC: 4.5 G/DL (ref 3.5–5.2)
ALP BLD-CCNC: 75 U/L (ref 40–129)
ALT SERPL-CCNC: 71 U/L (ref 0–40)
ANION GAP SERPL CALCULATED.3IONS-SCNC: 14 MMOL/L (ref 7–16)
AST SERPL-CCNC: 34 U/L (ref 0–39)
BILIRUB SERPL-MCNC: 0.5 MG/DL (ref 0–1.2)
BUN BLDV-MCNC: 14 MG/DL (ref 6–20)
CALCIUM SERPL-MCNC: 9.5 MG/DL (ref 8.6–10.2)
CHLORIDE BLD-SCNC: 104 MMOL/L (ref 98–107)
CHOLESTEROL, TOTAL: 192 MG/DL (ref 0–199)
CO2: 25 MMOL/L (ref 22–29)
CREAT SERPL-MCNC: 0.9 MG/DL (ref 0.7–1.2)
GFR AFRICAN AMERICAN: >60
GFR NON-AFRICAN AMERICAN: >60 ML/MIN/1.73
GLUCOSE BLD-MCNC: 107 MG/DL (ref 74–99)
HDLC SERPL-MCNC: 42 MG/DL
LDL CHOLESTEROL CALCULATED: 123 MG/DL (ref 0–99)
POTASSIUM SERPL-SCNC: 4 MMOL/L (ref 3.5–5)
SODIUM BLD-SCNC: 143 MMOL/L (ref 132–146)
TOTAL PROTEIN: 7.4 G/DL (ref 6.4–8.3)
TRIGL SERPL-MCNC: 136 MG/DL (ref 0–149)
VLDLC SERPL CALC-MCNC: 27 MG/DL

## 2021-04-16 LAB
HAV IGM SER IA-ACNC: NORMAL
HBV SURFACE AB TITR SER: NORMAL {TITER}
HEPATITIS B SURFACE ANTIGEN INTERPRETATION: NORMAL
HEPATITIS C ANTIBODY INTERPRETATION: NORMAL

## 2021-04-18 LAB — HAV AB SERPL IA-ACNC: NEGATIVE

## 2021-04-19 ENCOUNTER — OFFICE VISIT (OUTPATIENT)
Dept: PRIMARY CARE CLINIC | Age: 26
End: 2021-04-19
Payer: COMMERCIAL

## 2021-04-19 VITALS
DIASTOLIC BLOOD PRESSURE: 88 MMHG | WEIGHT: 264 LBS | OXYGEN SATURATION: 98 % | SYSTOLIC BLOOD PRESSURE: 138 MMHG | HEIGHT: 71 IN | BODY MASS INDEX: 36.96 KG/M2 | HEART RATE: 108 BPM | TEMPERATURE: 98 F

## 2021-04-19 DIAGNOSIS — F34.1 DYSTHYMIA: Primary | ICD-10-CM

## 2021-04-19 DIAGNOSIS — R03.0 ELEVATED BLOOD PRESSURE READING: ICD-10-CM

## 2021-04-19 DIAGNOSIS — Z00.00 HEALTH MAINTENANCE EXAMINATION: ICD-10-CM

## 2021-04-19 DIAGNOSIS — E78.2 MIXED HYPERLIPIDEMIA: ICD-10-CM

## 2021-04-19 DIAGNOSIS — J30.9 ALLERGIC RHINITIS, UNSPECIFIED SEASONALITY, UNSPECIFIED TRIGGER: ICD-10-CM

## 2021-04-19 DIAGNOSIS — R79.89 ELEVATED LFTS: ICD-10-CM

## 2021-04-19 PROCEDURE — 99214 OFFICE O/P EST MOD 30 MIN: CPT | Performed by: FAMILY MEDICINE

## 2021-04-19 PROCEDURE — 1036F TOBACCO NON-USER: CPT | Performed by: FAMILY MEDICINE

## 2021-04-19 PROCEDURE — G8427 DOCREV CUR MEDS BY ELIG CLIN: HCPCS | Performed by: FAMILY MEDICINE

## 2021-04-19 PROCEDURE — G8417 CALC BMI ABV UP PARAM F/U: HCPCS | Performed by: FAMILY MEDICINE

## 2021-04-19 RX ORDER — CETIRIZINE HYDROCHLORIDE 10 MG/1
10 TABLET ORAL DAILY PRN
Qty: 90 TABLET | Refills: 3 | Status: SHIPPED | OUTPATIENT
Start: 2021-04-19

## 2021-04-19 NOTE — PROGRESS NOTES
10/7/19  Fabricio Staff : 1995 Sex: male  Age: 23 y.o. Chief Complaint   Patient presents with    Discuss Labs    Discuss Medications     would like 75mg sertraline instead of 100mg         HPI:    Patient presents today for follow-up. He actually felt increased irritability and lowered his Zoloft to 75 mg and feels much better at this dose and would like to continue this. Anxiety depression been well controlled. He has been working very long hours, trying to prepare his new house and has a wedding in 3 months but overall doing well. He was late arriving today because of significant traffic situations and blood pressure was up when he arrived but returned to normal before leaving. Allergies been flaring up this time year but overall controlled with Zyrtec and would like a refill      Blood work shows a glucose of 107,  HDL 42 triglyceride 136 ALT 71 AST 34. Blames weight on winter eating.   Hepatitis A/B/C- but not immune to A or B and declines repeat B series or a series at this time    most Recent Labs  CBC  Lab Results   Component Value Date    WBC 7.3 2020    WBC 4.9 2019    RBC 4.85 2020    RBC 4.91 2019    HGB 15.0 2020    HGB 14.6 2019    HCT 42.5 2020    HCT 43.7 2019    MCV 87.6 2020    MCV 88.9 2019     2020     2019      CMP  Lab Results   Component Value Date     04/15/2021     2021     2020    K 4.0 04/15/2021    K 4.3 2021    K 3.8 2020     04/15/2021     2021     2020    CO2 25 04/15/2021    CO2 29 2021    CO2 25 2020    ANIONGAP 14 04/15/2021    ANIONGAP 10 2021    GLUCOSE 107 04/15/2021    GLUCOSE 99 2021    GLUCOSE 98 2020    BUN 14 04/15/2021    BUN 13 2021    BUN 12 2020    CREATININE 0.9 04/15/2021    CREATININE 0.9 2021    CREATININE 0.88 2020    LABGLOM stated  above. Denies mouth and tongue lesions and sore throat. CV: Denies chest discomfort, pain; diaphoresis, dizziness, edema, lightheadedness, orthopnea,  palpitations, syncope and near syncopal episode or any exertional symptoms  Resp: Denies cough, hemoptysis, pleuritic pain, SOB, sputum production and wheezing. GI: Denies abdominal pain, change in bowel habits, hematochezia, melena, nausea and vomiting. : Denies urinary symptoms including dysuria , urgency, frequency or hematuria. Musculo: Denies musculoskeletal symptoms.   Skin: Denies bruising, rash as above neuro: Denies headache, numbness, stiff neck, tingling and focal weakness slurred speech or facial  droop  Hema/Lymph: Denies bleeding/bruising tendency and enlarged lymph nodes        Current Outpatient Medications:     sertraline (ZOLOFT) 50 MG tablet, Take 1.5 tablets by mouth daily, Disp: 45 tablet, Rfl: 3    cetirizine (ZYRTEC) 10 MG tablet, Take 1 tablet by mouth daily as needed for Allergies, Disp: 90 tablet, Rfl: 3    sertraline (ZOLOFT) 100 MG tablet, Take 1 tablet by mouth daily, Disp: 30 tablet, Rfl: 3    EPINEPHrine (EPIPEN 2-KEREN) 0.3 MG/0.3ML SOAJ injection, Inject thigh as directed as needed anaphylaxis, may repeat if needed, Disp: 1 each, Rfl: 1    Multiple Vitamin (MULTI-VITAMIN DAILY PO), Take 1 tablet by mouth daily, Disp: , Rfl:   Allergies   Allergen Reactions    Bee Venom     Cephalexin Swelling       Past Medical History:   Diagnosis Date    Acne     Attention/concentration deficit, non-ADHD     Depression      Past Surgical History:   Procedure Laterality Date    REL OF TONGUE TIE AND CLOSURE WITH FLAP      TOE SURGERY      TONSILLECTOMY      TONSILLECTOMY AND ADENOIDECTOMY      TYMPANOSTOMY TUBE PLACEMENT      WISDOM TOOTH EXTRACTION       Family History   Problem Relation Age of Onset    Sudden Death Neg Hx      Social History     Tobacco Use    Smoking status: Never Smoker    Smokeless tobacco: Never Used   Substance Use Topics    Alcohol use: No    Drug use: No      Social History     Social History Narrative    PMH:    Health Maintenance:    Influenza Vaccination - (2015)    Tetanus Immunization - (2015)    Medical Problems:    Add    Acne - follows with advanced dermatology    depression    Surgical Hx:    T & A, Tympanostomy Tube Insertion, [place] Tooth Extraction    Reviewed, no changes. FH:    Father:    . (Hx)    Mother:    . (Hx)    Denies CM, sudden death, congenital or otherwise    Lawence Cushing  1995 Page #2    Reviewed, no changes. SH:    . (Mar[place])    Works [employer] [address]: Cigarette Use: Nonsmoker - no smokers at home        Vitals:    21 1350 21 1354 21 1420   BP: (!) 162/98 (!) 160/100 138/88   Pulse: 108     Temp: 98 °F (36.7 °C)     SpO2: 98%     Weight: 264 lb (119.7 kg)     Height: 5' 11\" (1.803 m)        Wt Readings from Last 3 Encounters:   21 264 lb (119.7 kg)   21 268 lb (121.6 kg)   10/19/20 258 lb (117 kg)          Exam:  Const: Appears comfortable. No signs of acute distress present. Head/Face: Atraumatic on inspection. Eyes: EOMI in both eyes. No discharge from the eyes. PERRL. Sclerae clear. ENMT: Auditory canals normal. Tympanic membranes: intact and translucent. External nose WNL. Nasal mucosa is clear. Oropharynx: No erythema or exudate. Posterior pharynx is normal.  Neck: Supple. Palpation reveals no adenopathy. No masses appreciated. No JVD. Carotids: no  bruits. Resp: Respirations are unlabored. Clear to auscultation. No rales, rhonchi or wheezes appreciated  over the lungs bilaterally. CV: Rate is regular. Rhythm is regular. No gallop or rubs. No heart murmur appreciated. Extremities: No clubbing, cyanosis, or edema. No calf inflammation or tenderness. Abdomen: Bowel sounds are normoactive.  Abdomen is soft, nontender, and modification. Monitor. Elevated LFTs   Likely fatty liver. Precautions reviewed. Risks of even fatty liver leading to cirrhosis reviewed. Lifestyle modification and appropriate diet and weight loss reviewed. Other than basic monitoring not interested in in-depth blood work or imaging, other than routine blood work. Weight loss encouraged. Monitor. ASX. Negative hepatitis A/B/C but not immune, he should repeat hepatitis B series and get hepatitis A series but defers this time. Allergic rhinitis  Continue Zyrtec nightly as needed. Counseled on nasal steroid as well. I believe he had Singulair in the past, risks of depression associated is reviewed. Elevated blood pressure  He states he was stressed when he got here because of his significant traffic jam and he was late arriving. Normalized by the time of leaving. He will monitor ambulatory, parameters reviewed and written. Notify if out of range. Low-salt diet emphasized, avoid stimulants including caffeine nicotine decongestants etc.    No flowsheet data found. Plan as above. Counseled extensively and differential diagnoses relevant to above were reviewed, including serious etiologies. Side effects and interactions of medications were reviewed. At this point he would like to continue the Zoloft 25 mg, refills given, defers vaccines now, monitor blood pressure ambulatory, stress reduction reviewed, precautions reviewed, otherwise plan blood work and follow-up in 3 months sooner as needed     as long as symptoms steadily improve/resolve, and medical conditions follow the expected course, FU as below, sooner PRN. Return in about 3 months (around 7/19/2021), or if symptoms worsen or fail to improve. Signs and symptoms to watch for discussed, serious signs and symptoms reviewed. ER if any.          Donavan Slaughter MD    Patients are advised to check with insurance company to ensure coverage and to fully understand benefits and cost prior to any testing. This note was created with the assistance of voice recognition software. Document was reviewed however may contain grammatical errors.   Mixed hyperlipidemia

## 2021-08-18 ENCOUNTER — OFFICE VISIT (OUTPATIENT)
Dept: PRIMARY CARE CLINIC | Age: 26
End: 2021-08-18
Payer: COMMERCIAL

## 2021-08-18 VITALS
WEIGHT: 268 LBS | DIASTOLIC BLOOD PRESSURE: 80 MMHG | TEMPERATURE: 97.8 F | OXYGEN SATURATION: 98 % | BODY MASS INDEX: 37.52 KG/M2 | SYSTOLIC BLOOD PRESSURE: 138 MMHG | HEART RATE: 84 BPM | HEIGHT: 71 IN

## 2021-08-18 DIAGNOSIS — Z00.00 HEALTH MAINTENANCE EXAMINATION: ICD-10-CM

## 2021-08-18 DIAGNOSIS — G47.33 OSA (OBSTRUCTIVE SLEEP APNEA): ICD-10-CM

## 2021-08-18 DIAGNOSIS — J30.9 ALLERGIC RHINITIS, UNSPECIFIED SEASONALITY, UNSPECIFIED TRIGGER: ICD-10-CM

## 2021-08-18 DIAGNOSIS — E78.2 MIXED HYPERLIPIDEMIA: ICD-10-CM

## 2021-08-18 DIAGNOSIS — F34.1 DYSTHYMIA: Primary | ICD-10-CM

## 2021-08-18 DIAGNOSIS — R79.89 ELEVATED LFTS: ICD-10-CM

## 2021-08-18 PROCEDURE — G8417 CALC BMI ABV UP PARAM F/U: HCPCS | Performed by: FAMILY MEDICINE

## 2021-08-18 PROCEDURE — G8427 DOCREV CUR MEDS BY ELIG CLIN: HCPCS | Performed by: FAMILY MEDICINE

## 2021-08-18 PROCEDURE — 99214 OFFICE O/P EST MOD 30 MIN: CPT | Performed by: FAMILY MEDICINE

## 2021-08-18 PROCEDURE — 1036F TOBACCO NON-USER: CPT | Performed by: FAMILY MEDICINE

## 2021-08-18 RX ORDER — FLUTICASONE PROPIONATE 50 MCG
2 SPRAY, SUSPENSION (ML) NASAL DAILY
Qty: 1 BOTTLE | Refills: 1 | Status: SHIPPED | OUTPATIENT
Start: 2021-08-18

## 2021-08-18 NOTE — PROGRESS NOTES
10/7/19  Jasmin Parikh : 1995 Sex: male  Age: 23 y.o. Chief Complaint   Patient presents with    3 Month Follow-Up         HPI:    Patient presents today for follow-up. He is here with his wife. He is tolerating and doing well with the Zoloft 75 mg daily. He would like to continue current dosing. Absolutely no SI/HI. He recently found out his wife is pregnant and he is excited about this. Counseled. Has excessive snoring and some daytime somnolence without narcoleptic symptoms or inappropriate falling asleep. Counseled on weight. Counseled on avoiding supine position. Nose feels plugged at times. No sinus pain pressure abnormal smell taste fever chills cough or otherwise. Counseled extensively on Covid vaccine today. Has not had yet. No history of Covid. Did not get blood work yet.   most Recent Labs  CBC  Lab Results   Component Value Date    WBC 7.3 2020    WBC 4.9 2019    RBC 4.85 2020    RBC 4.91 2019    HGB 15.0 2020    HGB 14.6 2019    HCT 42.5 2020    HCT 43.7 2019    MCV 87.6 2020    MCV 88.9 2019     2020     2019      CMP  Lab Results   Component Value Date     04/15/2021     2021     2020    K 4.0 04/15/2021    K 4.3 2021    K 3.8 2020     04/15/2021     2021     2020    CO2 25 04/15/2021    CO2 29 2021    CO2 25 2020    ANIONGAP 14 04/15/2021    ANIONGAP 10 2021    GLUCOSE 107 04/15/2021    GLUCOSE 99 2021    GLUCOSE 98 2020    BUN 14 04/15/2021    BUN 13 2021    BUN 12 2020    CREATININE 0.9 04/15/2021    CREATININE 0.9 2021    CREATININE 0.88 2020    LABGLOM >60 04/15/2021    LABGLOM >60 2021    LABGLOM 125 2019    GFRAA >60 04/15/2021    GFRAA >60 2021    CALCIUM 9.5 04/15/2021    CALCIUM 9.8 2021    CALCIUM 9.5 2020    PROT 7.4 04/15/2021    PROT 7.9 01/21/2021    LABALBU 4.5 04/15/2021    LABALBU 4.7 01/21/2021    LABALBU 4.4 07/02/2020    BILITOT 0.5 04/15/2021    BILITOT 0.6 01/21/2021    BILITOT 0.4 07/02/2020    ALKPHOS 75 04/15/2021    ALKPHOS 78 01/21/2021    ALKPHOS 76 07/02/2020    AST 34 04/15/2021    AST 31 01/21/2021    AST 46 07/02/2020    ALT 71 04/15/2021    ALT 62 01/21/2021    ALT 1.90 07/02/2020     A1C  No results found for: LABA1C  TSH  Lab Results   Component Value Date    TSH 1.90 07/02/2020    TSH 0.76 02/26/2019     FREET4  No results found for: V2YBPVR  LIPID  Lab Results   Component Value Date    CHOL 192 04/15/2021    CHOL 202 01/21/2021    CHOL 218 07/02/2020    HDL 42 04/15/2021    HDL 43 01/21/2021    HDL 36 07/02/2020    LDLCALC 123 04/15/2021    LDLCALC 106 01/21/2021    LDLCALC 138 02/26/2019    TRIG 136 04/15/2021    TRIG 264 01/21/2021    TRIG 417 07/02/2020    CHOLHDLRATIO 6.1 07/02/2020    CHOLHDLRATIO 5.3 02/26/2019    VLDL 168 02/26/2019     VITAMIN D  No results found for: VITD25  MAGNESIUM  No results found for: MG   PHOS  No results found for: PHOS   FELIX   No results found for: FELIX  RHEUMATOID FACTOR  No results found for: RF  PSA  No results found for: PSA   HEPATITIS C  Lab Results   Component Value Date    HCVABI Non-Reactive 04/15/2021     HIV  No results found for: MWZ8IFZ, HIV1QT  UA  No results found for: COLORU, CLARITYU, GLUCOSEU, BILIRUBINUR, KETUA, SPECGRAV, BLOODU, PHUR, PROTEINU, UROBILINOGEN, NITRU, LEUKOCYTESUR  Urine Micro/Albumin Ratio  No results found for: MALBCR          ROS:  Const: Denies chills, fever, malaise and sweats. Eyes: Denies discharge, pain, redness and visual disturbance. ENMT: Denies earaches, other ear symptoms. Denies nasal or sinus symptoms other than stated  above. Denies mouth and tongue lesions and sore throat.   CV: Denies chest discomfort, pain; diaphoresis, dizziness, edema, lightheadedness, orthopnea,  palpitations, syncope and near syncopal episode or any exertional symptoms  Resp: Denies cough, hemoptysis, pleuritic pain, SOB, sputum production and wheezing. GI: Denies abdominal pain, change in bowel habits, hematochezia, melena, nausea and vomiting. : Denies urinary symptoms including dysuria , urgency, frequency or hematuria. Musculo: Denies musculoskeletal symptoms.   Skin: Denies bruising, rash as above   neuro: Denies headache, numbness, stiff neck, tingling and focal weakness slurred speech or facial  droop  Hema/Lymph: Denies bleeding/bruising tendency and enlarged lymph nodes        Current Outpatient Medications:     sertraline (ZOLOFT) 50 MG tablet, Take 1.5 tablets by mouth daily, Disp: 45 tablet, Rfl: 3    fluticasone (FLONASE) 50 MCG/ACT nasal spray, 2 sprays by Each Nostril route daily ; may reduce to 1 spray each nostril daily maintenance, Disp: 1 Bottle, Rfl: 1    cetirizine (ZYRTEC) 10 MG tablet, Take 1 tablet by mouth daily as needed for Allergies, Disp: 90 tablet, Rfl: 3    EPINEPHrine (EPIPEN 2-KEREN) 0.3 MG/0.3ML SOAJ injection, Inject thigh as directed as needed anaphylaxis, may repeat if needed, Disp: 1 each, Rfl: 1    Multiple Vitamin (MULTI-VITAMIN DAILY PO), Take 1 tablet by mouth daily, Disp: , Rfl:   Allergies   Allergen Reactions    Bee Venom     Cephalexin Swelling       Past Medical History:   Diagnosis Date    Acne     Attention/concentration deficit, non-ADHD     Depression      Past Surgical History:   Procedure Laterality Date    REL OF TONGUE TIE AND CLOSURE WITH FLAP      TOE SURGERY      TONSILLECTOMY      TONSILLECTOMY AND ADENOIDECTOMY      TYMPANOSTOMY TUBE PLACEMENT      WISDOM TOOTH EXTRACTION       Family History   Problem Relation Age of Onset    Sudden Death Neg Hx      Social History     Tobacco Use    Smoking status: Never Smoker    Smokeless tobacco: Never Used   Substance Use Topics    Alcohol use: No    Drug use: No      Social History     Social History Narrative    PMH:    Health Maintenance:    Influenza Vaccination - (2015)    Tetanus Immunization - (2015)    Medical Problems:    Add    Acne - follows with advanced dermatology    depression    Surgical Hx:    T & A, Tympanostomy Tube Insertion, [place] Tooth Extraction    Reviewed, no changes. FH:    Father:    . (Hx)    Mother:    . (Hx)    Denies CM, sudden death, congenital or otherwise    Daniela Castillo  1995 Page #2    Reviewed, no changes. SH:    . (Marital)    Works [employer] for  of [address]: Cigarette Use: Nonsmoker - no smokers at home        Vitals:    21 1331   BP: 138/80   Pulse: 84   Temp: 97.8 °F (36.6 °C)   SpO2: 98%   Weight: 268 lb (121.6 kg)      Wt Readings from Last 3 Encounters:   21 268 lb (121.6 kg)   21 264 lb (119.7 kg)   21 268 lb (121.6 kg)          Exam:  Const: Appears comfortable. No signs of acute distress present. Head/Face: Atraumatic on inspection. Eyes: EOMI in both eyes. No discharge from the eyes. PERRL. Sclerae clear. ENMT: Auditory canals normal. Tympanic membranes: intact and translucent. External nose WNL. Nasal mucosa is clear. Oropharynx: No erythema or exudate. Posterior pharynx is normal.  Neck: Supple. Palpation reveals no adenopathy. No masses appreciated. No JVD. Carotids: no  bruits. Resp: Respirations are unlabored. Clear to auscultation. No rales, rhonchi or wheezes appreciated  over the lungs bilaterally. CV: Rate is regular. Rhythm is regular. No gallop or rubs. No heart murmur appreciated. Extremities: No clubbing, cyanosis, or edema. No calf inflammation or tenderness. Abdomen: Bowel sounds are normoactive. Abdomen is soft, nontender, and nondistended. No  abdominal masses. No palpable hepatosplenomegaly. Lymph: No palpable or visible regional lymphadenopathy. Musculoskeletal: no acute joint inflammation. Skin: Dry and warm. Neuro: Alert and oriented. Affect: appropriate. Upper Extremities: 5/5 bilaterally. Lower Extremities:  5/5 bilaterally. Sensation intact to light touch. Reflexes: DTR's are symmetric and 2+ bilaterally. .  Cranial Nerves: Cranial nerves grossly intact. Assessment and Plan:   Diagnosis Orders   1. Dysthymia  sertraline (ZOLOFT) 50 MG tablet   2. Allergic rhinitis, unspecified seasonality, unspecified trigger  fluticasone (FLONASE) 50 MCG/ACT nasal spray   3. PATO (obstructive sleep apnea)  Home Sleep Study   4. Elevated LFTs     5. Mixed hyperlipidemia     6. Health maintenance examination         No problem-specific Assessment & Plan notes found for this encounter. Dysthymia  Chronic, previously saw Dr. Aspen Johnson and was on Zoloft and Vyvanse. Dr. Aspen Johnson turned him over to  \"adult care\". He was on no medications for a while, developed depression again. Zoloft was titrated up to 75 mg daily, at 1 point in the remote past increased on his own to 100 mg, but noticed increased irritability at this dose so went back to 75 mg and doing well on this dose. Would like a refill. Declines psychiatry now unless symptoms persist or worsen. Adamantly  denies suicidal or homicidal ideations . recommend counseling, declines now. We did discuss  PRN hydroxyzine if needed but doing well now he states. Risk benefits reviewed including sedation not to drive or operate machinery. Paradoxical effects reviewed as well. Refill provided     Health maintenance examination  Health maintenance issues discussed at length 2/19. Encouraged yearly physicals. Had flu vaccine already. Considering Covid vaccine. Defers hepatitis A/B series at this time. Mixed hyperlipidemia  counseled. Last triglycerides normalized, 136, , HDL 42. Continue lifestyle modification. Monitor. Elevated LFTs   Likely fatty liver. Precautions reviewed. Risks of even fatty liver leading to cirrhosis reviewed.  Lifestyle modification and appropriate diet and weight loss reviewed. Other than basic monitoring not interested in in-depth blood work or imaging, other than routine blood work. Weight loss encouraged. Monitor. ASX. Negative hepatitis A/B/C but not immune, he should repeat hepatitis B series and get hepatitis A series but defers this time. Allergic rhinitis  Continue Zyrtec nightly as needed. Counseled on nasal steroid as well. Prescribed Flonase. R/P reviewed. I believe he had Singulair in the past, risks of depression associated is reviewed. PATO  Suspected, risk of untreated PATO reviewed. Driving precaution reviewed. Avoid supine sleep. Check home study. Follow-up after. No flowsheet data found. Plan as above. Counseled extensively and differential diagnoses relevant to above were reviewed, including serious etiologies. Side effects and interactions of medications were reviewed. Refill Zoloft. Prescribed Flonase. Order home sleep study. Blood work as ordered. Follow-up 3 months sooner as needed as well as after sleep study. Precautions reviewed. as long as symptoms steadily improve/resolve, and medical conditions follow the expected course, FU as below, sooner PRN. Return in about 3 months (around 11/18/2021), or if symptoms worsen or fail to improve, for physical.         Signs and symptoms to watch for discussed, serious signs and symptoms reviewed. ER if any. Richelle Bustamante MD    Patients are advised to check with insurance company to ensure coverage and to fully understand benefits and cost prior to any testing. This note was created with the assistance of voice recognition software. Document was reviewed however may contain grammatical errors.   Mixed hyperlipidemia

## 2021-11-16 ENCOUNTER — OFFICE VISIT (OUTPATIENT)
Dept: PRIMARY CARE CLINIC | Age: 26
End: 2021-11-16
Payer: COMMERCIAL

## 2021-11-16 VITALS
OXYGEN SATURATION: 98 % | HEART RATE: 95 BPM | WEIGHT: 265 LBS | SYSTOLIC BLOOD PRESSURE: 138 MMHG | BODY MASS INDEX: 36.96 KG/M2 | DIASTOLIC BLOOD PRESSURE: 80 MMHG | TEMPERATURE: 97.8 F

## 2021-11-16 DIAGNOSIS — F34.1 DYSTHYMIA: ICD-10-CM

## 2021-11-16 DIAGNOSIS — J40 BRONCHITIS: ICD-10-CM

## 2021-11-16 DIAGNOSIS — J45.31 MILD PERSISTENT ASTHMA WITH ACUTE EXACERBATION: Primary | ICD-10-CM

## 2021-11-16 PROCEDURE — 1036F TOBACCO NON-USER: CPT | Performed by: FAMILY MEDICINE

## 2021-11-16 PROCEDURE — G8417 CALC BMI ABV UP PARAM F/U: HCPCS | Performed by: FAMILY MEDICINE

## 2021-11-16 PROCEDURE — 99214 OFFICE O/P EST MOD 30 MIN: CPT | Performed by: FAMILY MEDICINE

## 2021-11-16 PROCEDURE — G8484 FLU IMMUNIZE NO ADMIN: HCPCS | Performed by: FAMILY MEDICINE

## 2021-11-16 PROCEDURE — G8427 DOCREV CUR MEDS BY ELIG CLIN: HCPCS | Performed by: FAMILY MEDICINE

## 2021-11-16 RX ORDER — AZITHROMYCIN 250 MG/1
TABLET, FILM COATED ORAL
Qty: 6 TABLET | Refills: 0 | Status: SHIPPED
Start: 2021-11-16 | End: 2022-01-05

## 2021-11-16 RX ORDER — ALBUTEROL SULFATE 90 UG/1
AEROSOL, METERED RESPIRATORY (INHALATION)
Qty: 18 G | Refills: 0 | Status: SHIPPED | OUTPATIENT
Start: 2021-11-16

## 2021-11-16 RX ORDER — FLUTICASONE PROPIONATE 110 UG/1
1 AEROSOL, METERED RESPIRATORY (INHALATION) 2 TIMES DAILY
Qty: 12 G | Refills: 1 | Status: SHIPPED
Start: 2021-11-16 | End: 2022-11-04

## 2021-11-16 NOTE — ASSESSMENT & PLAN NOTE
Counseled extensively. Differential reviewed, including serious etiologies. Start albuterol tract as needed. Start Flovent with precautions. Does not tolerate systemic steroids. Check chest x-ray. Defers referral or otherwise now.

## 2021-11-16 NOTE — ASSESSMENT & PLAN NOTE
Counseled extensively. Differential reviewed, including serious etiologies. Z-Rafael with precautions including potential interactions, prolonged QT, risk of C. difficile, risk benefits of probiotic reviewed. Plain Mucinex with precautions. Proper hydration. Covid and flu precautions reviewed, defers testing. Proper hydration and vitamins reviewed. Does not tolerate prednisone. Wrote for inhalers as below check chest x-ray.

## 2021-11-16 NOTE — PROGRESS NOTES
10/7/19  Adam Samayoa : 1995 Sex: male  Age: 23 y.o. Chief Complaint   Patient presents with    Annual Exam    Cough         HPI:  .    Patient presents today, originally scheduled for physical but having URI symptoms for 6 weeks. Seems worse with weather change. Has a green productive cough nasal congestion sinus pressure but no fever chills malaise abnormal smell taste. Getting intermittent wheezing. Taking his Flonase and Zyrtec. No history of asthma. Flu or Covid vaccine. Again symptoms have been present for 6 weeks without significant change. He is here with his wife. they are also asking for refill of Zoloft which he is tolerating well. Has not tolerated prednisone well in the past, has caused anger issues. Did not get blood work yet.     most Recent Labs  CBC  Lab Results   Component Value Date    WBC 7.3 2020    WBC 4.9 2019    RBC 4.85 2020    RBC 4.91 2019    HGB 15.0 2020    HGB 14.6 2019    HCT 42.5 2020    HCT 43.7 2019    MCV 87.6 2020    MCV 88.9 2019     2020     2019      CMP  Lab Results   Component Value Date     04/15/2021     2021     2020    K 4.0 04/15/2021    K 4.3 2021    K 3.8 2020     04/15/2021     2021     2020    CO2 25 04/15/2021    CO2 29 2021    CO2 25 2020    ANIONGAP 14 04/15/2021    ANIONGAP 10 2021    GLUCOSE 107 04/15/2021    GLUCOSE 99 2021    GLUCOSE 98 2020    BUN 14 04/15/2021    BUN 13 2021    BUN 12 2020    CREATININE 0.9 04/15/2021    CREATININE 0.9 2021    CREATININE 0.88 2020    LABGLOM >60 04/15/2021    LABGLOM >60 2021    LABGLOM 125 2019    GFRAA >60 04/15/2021    GFRAA >60 2021    CALCIUM 9.5 04/15/2021    CALCIUM 9.8 2021    CALCIUM 9.5 2020    PROT 7.4 04/15/2021    PROT 7.9 2021 LABALBU 4.5 04/15/2021    LABALBU 4.7 01/21/2021    LABALBU 4.4 07/02/2020    BILITOT 0.5 04/15/2021    BILITOT 0.6 01/21/2021    BILITOT 0.4 07/02/2020    ALKPHOS 75 04/15/2021    ALKPHOS 78 01/21/2021    ALKPHOS 76 07/02/2020    AST 34 04/15/2021    AST 31 01/21/2021    AST 46 07/02/2020    ALT 71 04/15/2021    ALT 62 01/21/2021    ALT 1.90 07/02/2020     A1C  No results found for: LABA1C  TSH  Lab Results   Component Value Date    TSH 1.90 07/02/2020    TSH 0.76 02/26/2019     FREET4  No results found for: Q0OLSUZ  LIPID  Lab Results   Component Value Date    CHOL 192 04/15/2021    CHOL 202 01/21/2021    CHOL 218 07/02/2020    HDL 42 04/15/2021    HDL 43 01/21/2021    HDL 36 07/02/2020    LDLCALC 123 04/15/2021    LDLCALC 106 01/21/2021    LDLCALC 138 02/26/2019    TRIG 136 04/15/2021    TRIG 264 01/21/2021    TRIG 417 07/02/2020    CHOLHDLRATIO 6.1 07/02/2020    CHOLHDLRATIO 5.3 02/26/2019    VLDL 168 02/26/2019     VITAMIN D  No results found for: VITD25  MAGNESIUM  No results found for: MG   PHOS  No results found for: PHOS   FELIX   No results found for: FELIX  RHEUMATOID FACTOR  No results found for: RF  PSA  No results found for: PSA   HEPATITIS C  Lab Results   Component Value Date    HCVABI Non-Reactive 04/15/2021     HIV  No results found for: [id number], HIV1QT  UA  No results found for: COLORU, CLARITYU, GLUCOSEU, BILIRUBINUR, KETUA, SPECGRAV, BLOODU, PHUR, PROTEINU, UROBILINOGEN, NITRU, LEUKOCYTESUR  Urine Micro/Albumin Ratio  No results found for: MALBCR          ROS:  Const: Denies chills, fever, malaise and sweats. Eyes: Denies discharge, pain, redness and visual disturbance. ENMT: Denies earaches, other ear symptoms. Denies nasal or sinus symptoms other than stated  above. Denies mouth and tongue lesions and sore throat.   CV: Denies chest discomfort, pain; diaphoresis, dizziness, edema, lightheadedness, orthopnea,  palpitations, syncope and near syncopal episode or any exertional symptoms  Resp: Denies hemoptysis, pleuritic pain, notes cough sputum production and wheezing with intermittent associated shortness of breath. GI: Denies abdominal pain, change in bowel habits, hematochezia, melena, nausea and vomiting. : Denies urinary symptoms including dysuria , urgency, frequency or hematuria. Musculo: Denies musculoskeletal symptoms. Skin: Denies bruising, rash as above   neuro: Denies headache, numbness, stiff neck, tingling and focal weakness slurred speech or facial  droop  Hema/Lymph: Denies bleeding/bruising tendency and enlarged lymph nodes        Current Outpatient Medications:     sertraline (ZOLOFT) 50 MG tablet, Take 1.5 tablets by mouth daily, Disp: 45 tablet, Rfl: 3    albuterol sulfate  (90 Base) MCG/ACT inhaler, 1-2 puffs q4-6 hrs prn, Disp: 18 g, Rfl: 0    fluticasone (FLOVENT HFA) 110 MCG/ACT inhaler, Inhale 1 puff into the lungs 2 times daily ;  Rinse  mouth after, Disp: 12 g, Rfl: 1    azithromycin (ZITHROMAX) 250 MG tablet, 2 po qd  day 1 then 1 po qd  days 2 - 5, Disp: 6 tablet, Rfl: 0    fluticasone (FLONASE) 50 MCG/ACT nasal spray, 2 sprays by Each Nostril route daily ; may reduce to 1 spray each nostril daily maintenance, Disp: 1 Bottle, Rfl: 1    cetirizine (ZYRTEC) 10 MG tablet, Take 1 tablet by mouth daily as needed for Allergies, Disp: 90 tablet, Rfl: 3    EPINEPHrine (EPIPEN 2-KEREN) 0.3 MG/0.3ML SOAJ injection, Inject thigh as directed as needed anaphylaxis, may repeat if needed, Disp: 1 each, Rfl: 1    Multiple Vitamin (MULTI-VITAMIN DAILY PO), Take 1 tablet by mouth daily, Disp: , Rfl:   Allergies   Allergen Reactions    Bee Venom     Cephalexin Swelling       Past Medical History:   Diagnosis Date    Acne     Attention/concentration deficit, non-ADHD     Depression      Past Surgical History:   Procedure Laterality Date    REL OF TONGUE TIE AND CLOSURE WITH FLAP      TOE SURGERY      TONSILLECTOMY      TONSILLECTOMY AND ADENOIDECTOMY      TYMPANOSTOMY TUBE PLACEMENT      WISDOM TOOTH EXTRACTION       Family History   Problem Relation Age of Onset    Sudden Death Neg Hx      Social History     Tobacco Use    Smoking status: Never Smoker    Smokeless tobacco: Never Used   Substance Use Topics    Alcohol use: No    Drug use: No      Social History     Social History Narrative    PMH:    Health Maintenance:    Influenza Vaccination - (12/7/2015)    Tetanus Immunization - (12/7/2015)    Medical Problems:    Add    Acne - follows with advanced dermatology    depression    Surgical Hx:    T & A, Tympanostomy Tube Insertion, [place] Tooth Extraction    Reviewed, no changes. FH:    Father:    . (Hx)    Mother:    . (Hx)    Denies CM, sudden death, congenital or otherwise        Reviewed, no changes. SH:    . (Marital) , baby on the way    Works [employer] for [employer] [address]: Cigarette Use: Nonsmoker - no smokers at home        Vitals:    11/16/21 1533   BP: 138/80   Pulse: 95   Temp: 97.8 °F (36.6 °C)   SpO2: 98%   Weight: 265 lb (120.2 kg)      Wt Readings from Last 3 Encounters:   11/16/21 265 lb (120.2 kg)   08/18/21 268 lb (121.6 kg)   04/19/21 264 lb (119.7 kg)          Exam:  Const: Appears comfortable. No signs of acute distress present. Head/Face: Atraumatic on inspection. Eyes: EOMI in both eyes. No discharge from the eyes. PERRL. Sclerae clear. ENMT: Auditory canals normal. Tympanic membranes: intact and translucent. External nose WNL. Nasal mucosa is clear. Oropharynx: No erythema or exudate. Posterior pharynx is normal.  Neck: Supple. Palpation reveals no adenopathy. No masses appreciated. No JVD. Carotids: no  bruits. Resp: Respirations are unlabored. Clear to auscultation. No rales, rhonchi or wheezes appreciated  over the lungs bilaterally. CV: Rate is regular. Rhythm is regular. No gallop or rubs. No heart murmur appreciated. Extremities: No clubbing, cyanosis, or edema.  No calf inflammation or tenderness. Abdomen: Bowel sounds are normoactive. Abdomen is soft, nontender, and nondistended. No  abdominal masses. No palpable hepatosplenomegaly. Lymph: No palpable or visible regional lymphadenopathy. Musculoskeletal: no acute joint inflammation. Skin: Dry and warm. Neuro: Alert and oriented. Affect: appropriate. Upper Extremities: 5/5 bilaterally. Lower Extremities:  5/5 bilaterally. Sensation intact to light touch. Reflexes: DTR's are symmetric and 2+ bilaterally. .  Cranial Nerves: Cranial nerves grossly intact. Assessment and Plan:   Diagnosis Orders   1. Mild persistent asthma with acute exacerbation  XR CHEST (2 VW)    albuterol sulfate  (90 Base) MCG/ACT inhaler    fluticasone (FLOVENT HFA) 110 MCG/ACT inhaler   2. Bronchitis  XR CHEST (2 VW)    azithromycin (ZITHROMAX) 250 MG tablet   3. Dysthymia  sertraline (ZOLOFT) 50 MG tablet       Bronchitis    Counseled extensively. Differential reviewed, including serious etiologies. Z-Rafael with precautions including potential interactions, prolonged QT, risk of C. difficile, risk benefits of probiotic reviewed. Plain Mucinex with precautions. Proper hydration. Covid and flu precautions reviewed, defers testing. Proper hydration and vitamins reviewed. Does not tolerate prednisone. Wrote for inhalers as below check chest x-ray. Mild persistent asthma with acute exacerbation    Counseled extensively. Differential reviewed, including serious etiologies. Start albuterol tract as needed. Start Flovent with precautions. Does not tolerate systemic steroids. Check chest x-ray. Defers referral or otherwise now. Dysthymia  Chronic, previously saw Dr. Carmina Escamilla and was on Zoloft and Vyvanse. Dr. Carmina Escamilla turned him over to  \"adult care\". He was on no medications for a while, developed depression again.   Zoloft was titrated up to 75 mg daily, at 1 point in the remote past increased on his own to 100 mg, but noticed increased irritability at this dose so went back to 75 mg and doing well on this dose. Declines psychiatry now unless symptoms persist or worsen. Adamantly  denies suicidal or homicidal ideations . recommend counseling, declines now. We did discuss  PRN hydroxyzine if needed but doing well now he states. Risk benefits reviewed including sedation not to drive or operate machinery. Paradoxical effects reviewed as well. Refill on Zoloft provided    Health maintenance examination  Health maintenance issues discussed at length 2/19. Encouraged yearly physicals. Counseled on flu vaccine and Covid vaccine. .    Mixed hyperlipidemia  counseled. Last triglycerides normalized, 136, , HDL 42. Continue lifestyle modification. Monitor. Elevated LFTs   Likely fatty liver. Precautions reviewed. Risks of even fatty liver leading to cirrhosis reviewed. Lifestyle modification and appropriate diet and weight loss reviewed. Other than basic monitoring not interested in in-depth blood work or imaging, other than routine blood work. Weight loss encouraged. Monitor. ASX. Negative hepatitis A/B/C but not immune, he should repeat hepatitis B series and get hepatitis A series but defers this time. Awaiting repeat blood work      Allergic rhinitis  Continue Zyrtec nightly as needed. Counseled on nasal steroid as well. Prescribed Flonase. R/P reviewed. I believe he had Singulair in the past, risks of depression associated is reviewed. PATO  Suspected, risk of untreated PATO reviewed. Driving precaution reviewed. Avoid supine sleep. Awaiting home sleep study follow-up after. No flowsheet data found. Plan as above. Counseled extensively and differential diagnoses relevant to above were reviewed, including serious etiologies. Side effects and interactions of medications were reviewed. Refill Zoloft. Start Z-Rafael with precautions and interactions reviewed. Continue Flonase.   Zyrtec if then congestion hold if thick congestion. Plain Mucinex. Albuterol as directed as needed, Flovent. Check chest x-ray. Await blood work. As long as symptoms steadily improve/resolve follow-up 2 weeks or as needed      Return in about 2 weeks (around 11/30/2021), or if symptoms worsen or fail to improve, for physical.         Signs and symptoms to watch for discussed, serious signs and symptoms reviewed. ER if any. Moises Goldberg MD    Patients are advised to check with insurance company to ensure coverage and to fully understand benefits and cost prior to any testing. This note was created with the assistance of voice recognition software. Document was reviewed however may contain grammatical errors.   Mixed hyperlipidemia

## 2021-11-30 ENCOUNTER — OFFICE VISIT (OUTPATIENT)
Dept: PRIMARY CARE CLINIC | Age: 26
End: 2021-11-30
Payer: COMMERCIAL

## 2021-11-30 VITALS
DIASTOLIC BLOOD PRESSURE: 84 MMHG | HEART RATE: 84 BPM | WEIGHT: 266.2 LBS | SYSTOLIC BLOOD PRESSURE: 128 MMHG | BODY MASS INDEX: 37.13 KG/M2 | TEMPERATURE: 98.1 F | OXYGEN SATURATION: 98 %

## 2021-11-30 DIAGNOSIS — B96.89 ACUTE BACTERIAL SINUSITIS: ICD-10-CM

## 2021-11-30 DIAGNOSIS — F34.1 DYSTHYMIA: ICD-10-CM

## 2021-11-30 DIAGNOSIS — J40 BRONCHITIS: Primary | ICD-10-CM

## 2021-11-30 DIAGNOSIS — J45.31 MILD PERSISTENT ASTHMA WITH ACUTE EXACERBATION: ICD-10-CM

## 2021-11-30 DIAGNOSIS — J01.90 ACUTE BACTERIAL SINUSITIS: ICD-10-CM

## 2021-11-30 DIAGNOSIS — J30.9 ALLERGIC RHINITIS, UNSPECIFIED SEASONALITY, UNSPECIFIED TRIGGER: ICD-10-CM

## 2021-11-30 DIAGNOSIS — R79.89 ELEVATED LFTS: ICD-10-CM

## 2021-11-30 DIAGNOSIS — E78.2 MIXED HYPERLIPIDEMIA: ICD-10-CM

## 2021-11-30 DIAGNOSIS — G47.33 OSA (OBSTRUCTIVE SLEEP APNEA): ICD-10-CM

## 2021-11-30 PROCEDURE — G8484 FLU IMMUNIZE NO ADMIN: HCPCS | Performed by: FAMILY MEDICINE

## 2021-11-30 PROCEDURE — G8417 CALC BMI ABV UP PARAM F/U: HCPCS | Performed by: FAMILY MEDICINE

## 2021-11-30 PROCEDURE — 99395 PREV VISIT EST AGE 18-39: CPT | Performed by: FAMILY MEDICINE

## 2021-11-30 PROCEDURE — G8427 DOCREV CUR MEDS BY ELIG CLIN: HCPCS | Performed by: FAMILY MEDICINE

## 2021-11-30 PROCEDURE — 1036F TOBACCO NON-USER: CPT | Performed by: FAMILY MEDICINE

## 2021-11-30 PROCEDURE — 99213 OFFICE O/P EST LOW 20 MIN: CPT | Performed by: FAMILY MEDICINE

## 2021-11-30 RX ORDER — AMOXICILLIN AND CLAVULANATE POTASSIUM 875; 125 MG/1; MG/1
1 TABLET, FILM COATED ORAL 2 TIMES DAILY
Qty: 20 TABLET | Refills: 0 | Status: SHIPPED | OUTPATIENT
Start: 2021-11-30 | End: 2021-12-10

## 2021-11-30 NOTE — PROGRESS NOTES
10/7/19  Angela Roldan : 1995 Sex: male  Age: 27 y.o. Chief Complaint   Patient presents with    Annual Exam     follow up for asthma         HPI:  .    Patient presents today, with his wife for annual physical as well as persistent URI symptoms. Although his cough has significantly improved it is still present productive of green sputum. He does have thick postnasal drainage. However no other sinus pain pressure fever chills abnormal smell or taste. His albuterol does help, states he only needs it twice a week. Does not sound like he is on the Flovent. He is using Claritin. Not using his Flonase. Chest x-ray was negative. Did not do blood work yet. Tolerating Zoloft well and helping. Did not get blood work yet. Health Maintenance:  Proper diet reviewed including Mediterranean and DASH diets. Counseled on healthy weight, appropriate exercise, avoidance of tobacco, and recommendations for minimal to no alcohol consumption. Counseled on the potential pros and cons of vitamins and supplements. Reviewed the recommendations and risk/benefits of vaccines including, covid - counseled on vaccine;  Td/Tdap (12/15),  Pneumovax (counseled),  prevnar 13 (counsled) , flu vaccine (deferring till feeling better), Hepatitis vaccines (had B, counseled on A), gardasil (had x 3), and shingrix (patient had varicella x 2). HIV and Hep C screening guidelines were reviewed. Importance of regular eye and dental exams and health reviewed. Risks/Benefits of ASA reviewed and discussed latest guidelines. Sun protection reviewed. Notify if any new or changing moles/skin lesions, etc. Dexa Scan indications/ risk factors for osteoporotic fractures (and associated M/M) and preventative measures reviewed. Counseled on testicular exams and prostate exams. Colonoscopy recommendations reviewed. Pt denies change in bowel habits or [address] of colon polyps/CA.   Fit test discussed     Indications for EKG and additional  cardiac testing including referrals, stress testing,  2d echo, ect. dasx. Reviewed indications for other testing such as  PFT's.and  indications for imaging including brain, carotid, chest, abdominal, aortic .  dasx.         most Recent Labs  CBC  Lab Results   Component Value Date    WBC 7.3 07/02/2020    WBC 4.9 02/26/2019    RBC 4.85 07/02/2020    RBC 4.91 02/26/2019    HGB 15.0 07/02/2020    HGB 14.6 02/26/2019    HCT 42.5 07/02/2020    HCT 43.7 02/26/2019    MCV 87.6 07/02/2020    MCV 88.9 02/26/2019     07/02/2020     02/26/2019      CMP  Lab Results   Component Value Date     04/15/2021     01/21/2021     07/02/2020    K 4.0 04/15/2021    K 4.3 01/21/2021    K 3.8 07/02/2020     04/15/2021     01/21/2021     07/02/2020    CO2 25 04/15/2021    CO2 29 01/21/2021    CO2 25 07/02/2020    ANIONGAP 14 04/15/2021    ANIONGAP 10 01/21/2021    GLUCOSE 107 04/15/2021    GLUCOSE 99 01/21/2021    GLUCOSE 98 07/02/2020    BUN 14 04/15/2021    BUN 13 01/21/2021    BUN 12 07/02/2020    CREATININE 0.9 04/15/2021    CREATININE 0.9 01/21/2021    CREATININE 0.88 07/02/2020    LABGLOM >60 04/15/2021    LABGLOM >60 01/21/2021    LABGLOM 125 02/26/2019    GFRAA >60 04/15/2021    GFRAA >60 01/21/2021    CALCIUM 9.5 04/15/2021    CALCIUM 9.8 01/21/2021    CALCIUM 9.5 07/02/2020    PROT 7.4 04/15/2021    PROT 7.9 01/21/2021    LABALBU 4.5 04/15/2021    LABALBU 4.7 01/21/2021    LABALBU 4.4 07/02/2020    BILITOT 0.5 04/15/2021    BILITOT 0.6 01/21/2021    BILITOT 0.4 07/02/2020    ALKPHOS 75 04/15/2021    ALKPHOS 78 01/21/2021    ALKPHOS 76 07/02/2020    AST 34 04/15/2021    AST 31 01/21/2021    AST 46 07/02/2020    ALT 71 04/15/2021    ALT 62 01/21/2021    ALT 1.90 07/02/2020     A1C  No results found for: LABA1C  TSH  Lab Results   Component Value Date    TSH 1.90 07/02/2020    TSH 0.76 02/26/2019     FREET4  No results found for: O5RWBXG  LIPID  Lab Results   Component Value Date    CHOL 192 04/15/2021    CHOL 202 01/21/2021    CHOL 218 07/02/2020    HDL 42 04/15/2021    HDL 43 01/21/2021    HDL 36 07/02/2020    LDLCALC 123 04/15/2021    LDLCALC 106 01/21/2021    LDLCALC 138 02/26/2019    TRIG 136 04/15/2021    TRIG 264 01/21/2021    TRIG 417 07/02/2020    CHOLHDLRATIO 6.1 07/02/2020    CHOLHDLRATIO 5.3 02/26/2019    VLDL 168 02/26/2019     VITAMIN D  No results found for: VITD25  MAGNESIUM  No results found for: MG   PHOS  No results found for: PHOS   FELIX   No results found for: FELIX  RHEUMATOID FACTOR  No results found for: RF  PSA  No results found for: PSA   HEPATITIS C  Lab Results   Component Value Date    HCVABI Non-Reactive 04/15/2021     HIV  No results found for: PHV7IZV, HIV1QT  UA  No results found for: COLORU, CLARITYU, GLUCOSEU, BILIRUBINUR, KETUA, SPECGRAV, BLOODU, PHUR, PROTEINU, UROBILINOGEN, NITRU, LEUKOCYTESUR  Urine Micro/Albumin Ratio  No results found for: MALBCR          ROS:  Const: Denies chills, fever, malaise and sweats. Eyes: Denies discharge, pain, redness and visual disturbance. ENMT: Denies earaches, other ear symptoms. Nasal congestion with postnasal drainage as above denies mouth and tongue lesions and sore throat. CV: Denies chest discomfort, pain; diaphoresis, dizziness, edema, lightheadedness, orthopnea,  palpitations, syncope and near syncopal episode or any exertional symptoms  Resp: Denies  hemoptysis, pleuritic pain, notes cough, although improved still spastic at times, productive with occasional wheezing, no shortness of breath   GI: Denies abdominal pain, change in bowel habits, hematochezia, melena, nausea and vomiting. : Denies urinary symptoms including dysuria , urgency, frequency or hematuria. Musculo: Denies musculoskeletal symptoms.   Skin: Denies bruising, rash as above   neuro: Denies headache, numbness, stiff neck, tingling and focal weakness slurred speech or facial  droop  Hema/Lymph: Denies bleeding/bruising tendency and enlarged lymph nodes        Current Outpatient Medications:     amoxicillin-clavulanate (AUGMENTIN) 875-125 MG per tablet, Take 1 tablet by mouth 2 times daily for 10 days, Disp: 20 tablet, Rfl: 0    sertraline (ZOLOFT) 50 MG tablet, Take 1.5 tablets by mouth daily, Disp: 45 tablet, Rfl: 3    albuterol sulfate  (90 Base) MCG/ACT inhaler, 1-2 puffs q4-6 hrs prn, Disp: 18 g, Rfl: 0    fluticasone (FLOVENT HFA) 110 MCG/ACT inhaler, Inhale 1 puff into the lungs 2 times daily ;  Rinse  mouth after, Disp: 12 g, Rfl: 1    azithromycin (ZITHROMAX) 250 MG tablet, 2 po qd  day 1 then 1 po qd  days 2 - 5, Disp: 6 tablet, Rfl: 0    fluticasone (FLONASE) 50 MCG/ACT nasal spray, 2 sprays by Each Nostril route daily ; may reduce to 1 spray each nostril daily maintenance, Disp: 1 Bottle, Rfl: 1    cetirizine (ZYRTEC) 10 MG tablet, Take 1 tablet by mouth daily as needed for Allergies, Disp: 90 tablet, Rfl: 3    EPINEPHrine (EPIPEN 2-KEREN) 0.3 MG/0.3ML SOAJ injection, Inject thigh as directed as needed anaphylaxis, may repeat if needed, Disp: 1 each, Rfl: 1    Multiple Vitamin (MULTI-VITAMIN DAILY PO), Take 1 tablet by mouth daily, Disp: , Rfl:   Allergies   Allergen Reactions    Bee Venom     Cephalexin Swelling       Past Medical History:   Diagnosis Date    Acne     Attention/concentration deficit, non-ADHD     Depression      Past Surgical History:   Procedure Laterality Date    REL OF TONGUE TIE AND CLOSURE WITH FLAP      TOE SURGERY      TONSILLECTOMY      TONSILLECTOMY AND ADENOIDECTOMY      TYMPANOSTOMY TUBE PLACEMENT      WISDOM TOOTH EXTRACTION       Family History   Problem Relation Age of Onset    Sudden Death Neg Hx      Social History     Tobacco Use    Smoking status: Never Smoker    Smokeless tobacco: Never Used   Substance Use Topics    Alcohol use: No    Drug use: No      Social History     Social History Narrative    PMH:    Health Maintenance:    Influenza Vaccination - (12/7/2015)    Tetanus Immunization - (12/7/2015)    Medical Problems:    Add    Acne - follows with [facility]    depression    Surgical Hx:    T & A, Tympanostomy Tube Insertion, [place] Beque Tooth Extraction    Reviewed, no changes. FH:    Father:    . (Hx)    Mother:    . (Hx)    Denies CM, sudden death, congenital or otherwise        Reviewed, no changes. SH:    . (Marital) , baby on the way    Works [employer] for  of [address]: Cigarette Use: Nonsmoker - no smokers at home        Vitals:    11/30/21 1542   BP: 128/84   Site: Right Upper Arm   Position: Sitting   Pulse: 84   Temp: 98.1 °F (36.7 °C)   TempSrc: Temporal   SpO2: 98%   Weight: 266 lb 3.2 oz (120.7 kg)      Wt Readings from Last 3 Encounters:   11/30/21 266 lb 3.2 oz (120.7 kg)   11/16/21 265 lb (120.2 kg)   08/18/21 268 lb (121.6 kg)          Exam:  Const: Appears comfortable. No signs of acute distress present. Head/Face: Atraumatic on inspection. Eyes: EOMI in both eyes. No discharge from the eyes. PERRL. Sclerae clear. ENMT: Ears fluid nose boggy oropharynx thick postnasal drainage exudate neck: Supple. Palpation reveals no adenopathy. No masses appreciated. No JVD. Carotids: no  bruits. Resp: Respirations are unlabored. Clear to auscultation. No rales, rhonchi or wheezes appreciated  over the lungs bilaterally. CV: Rate is regular. Rhythm is regular. No gallop or rubs. No heart murmur appreciated. Extremities: No clubbing, cyanosis, or edema. No calf inflammation or tenderness. Abdomen: Bowel sounds are normoactive. Abdomen is soft, nontender, and nondistended. No  abdominal masses. No palpable hepatosplenomegaly. Lymph: No palpable or visible regional lymphadenopathy. Musculoskeletal: no acute joint inflammation. Skin: Dry and warm. Neuro: Alert and oriented. Affect: appropriate. Upper Extremities: 5/5 bilaterally.  Lower Extremities:  5/5 bilaterally. Sensation intact to light touch. Reflexes: DTR's are symmetric and 2+ bilaterally. .  Cranial Nerves: Cranial nerves grossly intact. Assessment and Plan:   Diagnosis Orders   1. Bronchitis  amoxicillin-clavulanate (AUGMENTIN) 875-125 MG per tablet   2. Acute bacterial sinusitis  amoxicillin-clavulanate (AUGMENTIN) 875-125 MG per tablet   3. Mild persistent asthma with acute exacerbation     4. Dysthymia     5. Allergic rhinitis, unspecified seasonality, unspecified trigger     6. PATO (obstructive sleep apnea)     7. Elevated LFTs     8. Mixed hyperlipidemia         Bronchitis    Counseled extensively. Differential reviewed, including serious etiologies. Although improved after Z-Rafael still symptomatic as below. Appropriate use of his inhalers reviewed. Plain Mucinex with precautions. Proper hydration. Covid and flu precautions reviewed, defers testing. Proper hydration and vitamins reviewed. Does not tolerate prednisone. Recent chest x-ray negative    Sinusitis  Counseled extensively. Differential reviewed, including serious etiologies. Counseled. Differential reviewed. Antibiotic as per EMR, standard precautions reviewed including C. Difficile. R/B probiotics reviewed. Mucinex as directed with precautions. Potential interactions reviewed. Proper hydration reviewed. Coolmist vaporizer as directed. Mono precautions reviewed. Counseled on nasal saline. Counseled on nasal steroid. Symptoms of been present for more than a month. Defers Covid testing. Precautions reviewed. Despite allergy to cephalexin he has done well with Augmentin, standard precautions reviewed including cross reaction and counseled on instructions if this occurred. Mild persistent asthma with acute exacerbation    Counseled extensively. Differential reviewed, including serious etiologies. Continue albuterol as directed as needed. Encouraged use of Flovent with precautions as directed.   Does not tolerate systemic steroids. Chest x-ray negative defers referral or otherwise now. Dysthymia  Chronic, previously saw Dr. Cecily Middleton and was on Zoloft and Vyvanse. Dr. Cecily Middleton turned him over to  \"adult care\". He was on no medications for a while, developed depression again. Zoloft was titrated up to 75 mg daily, at 1 point in the remote past increased on his own to 100 mg, but noticed increased irritability at this dose so went back to 75 mg and doing well on this dose. Declines psychiatry now unless symptoms persist or worsen. Adamantly  denies suicidal or homicidal ideations . recommend counseling, declines now. We did discuss  PRN hydroxyzine if needed but doing well now he states. Risk benefits reviewed including sedation not to drive or operate machinery. Paradoxical effects reviewed as well. Health maintenance examination  Health maintenance issues discussed at length as above, 11/30/2021 . Encouraged yearly physicals. Mixed hyperlipidemia  counseled. Last triglycerides normalized, 136, , HDL 42. Continue lifestyle modification. Monitor. Elevated LFTs   Likely fatty liver. Precautions reviewed. Risks of even fatty liver leading to cirrhosis reviewed. Lifestyle modification and appropriate diet and weight loss reviewed. Other than basic monitoring not interested in in-depth blood work or imaging, other than routine blood work. Weight loss encouraged. Monitor. ASX. Negative hepatitis A/B/C but not immune, he should repeat hepatitis B series and get hepatitis A series but defers this time. Awaiting repeat blood work      Allergic rhinitis  He is currently on Claritin and would recommend holding if evidence of sinusitis. If no evidence of sinusitis, may resume Claritin versus Allegra versus Zyrtec as directed as needed with standard precautions. Recommended he restart Flonase with standard precautions.   He is also had Singulair in the past, risk benefits reviewed including depression        PATO  Suspected, risk of untreated PATO reviewed. Driving precaution reviewed. Avoid supine sleep. Awaiting home sleep study follow-up after. No flowsheet data found. Plan as above. Counseled extensively and differential diagnoses relevant to above were reviewed, including serious etiologies. Side effects and interactions of medications were reviewed. Full health maintenance issues addressed as above as well as other medical concerns and diagnoses as above at today's visit. Start Augmentin. Blood work as ordered. Discussion as above. As long as symptoms steadily improve/resolve follow-up 1 month sooner as needed. Sooner if symptoms persist or worsen. Further determine use in need of inhalers at that time    Return in about 1 month (around 12/30/2021), or if symptoms worsen or fail to improve. Signs and symptoms to watch for discussed, serious signs and symptoms reviewed. ER if any. Pavan Perez MD    Patients are advised to check with insurance company to ensure coverage and to fully understand benefits and cost prior to any testing. This note was created with the assistance of voice recognition software. Document was reviewed however may contain grammatical errors.   Mixed hyperlipidemia

## 2022-01-05 ENCOUNTER — OFFICE VISIT (OUTPATIENT)
Dept: PRIMARY CARE CLINIC | Age: 27
End: 2022-01-05
Payer: COMMERCIAL

## 2022-01-05 VITALS
TEMPERATURE: 97.8 F | OXYGEN SATURATION: 97 % | WEIGHT: 268 LBS | BODY MASS INDEX: 37.38 KG/M2 | HEART RATE: 71 BPM | SYSTOLIC BLOOD PRESSURE: 130 MMHG | DIASTOLIC BLOOD PRESSURE: 82 MMHG

## 2022-01-05 DIAGNOSIS — R79.89 ELEVATED LFTS: Primary | ICD-10-CM

## 2022-01-05 DIAGNOSIS — J30.9 ALLERGIC RHINITIS, UNSPECIFIED SEASONALITY, UNSPECIFIED TRIGGER: ICD-10-CM

## 2022-01-05 DIAGNOSIS — B96.89 ACUTE BACTERIAL SINUSITIS: ICD-10-CM

## 2022-01-05 DIAGNOSIS — J40 BRONCHITIS: ICD-10-CM

## 2022-01-05 DIAGNOSIS — F34.1 DYSTHYMIA: ICD-10-CM

## 2022-01-05 DIAGNOSIS — E78.2 MIXED HYPERLIPIDEMIA: ICD-10-CM

## 2022-01-05 DIAGNOSIS — J01.90 ACUTE BACTERIAL SINUSITIS: ICD-10-CM

## 2022-01-05 DIAGNOSIS — G47.33 OSA (OBSTRUCTIVE SLEEP APNEA): ICD-10-CM

## 2022-01-05 DIAGNOSIS — J45.31 MILD PERSISTENT ASTHMA WITH ACUTE EXACERBATION: ICD-10-CM

## 2022-01-05 PROCEDURE — 99214 OFFICE O/P EST MOD 30 MIN: CPT | Performed by: FAMILY MEDICINE

## 2022-01-05 PROCEDURE — G8417 CALC BMI ABV UP PARAM F/U: HCPCS | Performed by: FAMILY MEDICINE

## 2022-01-05 PROCEDURE — G8427 DOCREV CUR MEDS BY ELIG CLIN: HCPCS | Performed by: FAMILY MEDICINE

## 2022-01-05 PROCEDURE — 1036F TOBACCO NON-USER: CPT | Performed by: FAMILY MEDICINE

## 2022-01-05 PROCEDURE — G8484 FLU IMMUNIZE NO ADMIN: HCPCS | Performed by: FAMILY MEDICINE

## 2022-01-05 ASSESSMENT — PATIENT HEALTH QUESTIONNAIRE - PHQ9
SUM OF ALL RESPONSES TO PHQ QUESTIONS 1-9: 0
10. IF YOU CHECKED OFF ANY PROBLEMS, HOW DIFFICULT HAVE THESE PROBLEMS MADE IT FOR YOU TO DO YOUR WORK, TAKE CARE OF THINGS AT HOME, OR GET ALONG WITH OTHER PEOPLE: 0
4. FEELING TIRED OR HAVING LITTLE ENERGY: 0
6. FEELING BAD ABOUT YOURSELF - OR THAT YOU ARE A FAILURE OR HAVE LET YOURSELF OR YOUR FAMILY DOWN: 0
5. POOR APPETITE OR OVEREATING: 0
2. FEELING DOWN, DEPRESSED OR HOPELESS: 0
7. TROUBLE CONCENTRATING ON THINGS, SUCH AS READING THE NEWSPAPER OR WATCHING TELEVISION: 0
SUM OF ALL RESPONSES TO PHQ QUESTIONS 1-9: 0
SUM OF ALL RESPONSES TO PHQ QUESTIONS 1-9: 0
9. THOUGHTS THAT YOU WOULD BE BETTER OFF DEAD, OR OF HURTING YOURSELF: 0
SUM OF ALL RESPONSES TO PHQ QUESTIONS 1-9: 0
8. MOVING OR SPEAKING SO SLOWLY THAT OTHER PEOPLE COULD HAVE NOTICED. OR THE OPPOSITE, BEING SO FIGETY OR RESTLESS THAT YOU HAVE BEEN MOVING AROUND A LOT MORE THAN USUAL: 0
3. TROUBLE FALLING OR STAYING ASLEEP: 0

## 2022-01-05 NOTE — PROGRESS NOTES
10/7/19  Lebron Goodman : 1995 Sex: male  Age: 27 y.o. Chief Complaint   Patient presents with    1 Month Follow-Up    Medication Check         HPI:  .    Patient presents today, with his wife for follow-up. He would like to get back to annual visits stating that he feels very well now. Sinus symptoms resolved. Cough resolved. Not using inhaler. Sleep symptoms resolved. Canceled his home study. Tolerating Zoloft very well. Went to get blood work today with the lab was closed, says he will go. No other complaints or concerns. Last blood work reviewed.       Had first Covid vaccine      most Recent Labs  CBC  Lab Results   Component Value Date    WBC 7.3 2020    WBC 4.9 2019    RBC 4.85 2020    RBC 4.91 2019    HGB 15.0 2020    HGB 14.6 2019    HCT 42.5 2020    HCT 43.7 2019    MCV 87.6 2020    MCV 88.9 2019     2020     2019      CMP  Lab Results   Component Value Date     04/15/2021     2021     2020    K 4.0 04/15/2021    K 4.3 2021    K 3.8 2020     04/15/2021     2021     2020    CO2 25 04/15/2021    CO2 29 2021    CO2 25 2020    ANIONGAP 14 04/15/2021    ANIONGAP 10 2021    GLUCOSE 107 04/15/2021    GLUCOSE 99 2021    GLUCOSE 98 2020    BUN 14 04/15/2021    BUN 13 2021    BUN 12 2020    CREATININE 0.9 04/15/2021    CREATININE 0.9 2021    CREATININE 0.88 2020    LABGLOM >60 04/15/2021    LABGLOM >60 2021    LABGLOM 125 2019    GFRAA >60 04/15/2021    GFRAA >60 2021    CALCIUM 9.5 04/15/2021    CALCIUM 9.8 2021    CALCIUM 9.5 2020    PROT 7.4 04/15/2021    PROT 7.9 2021    LABALBU 4.5 04/15/2021    LABALBU 4.7 2021    LABALBU 4.4 2020    BILITOT 0.5 04/15/2021    BILITOT 0.6 2021    BILITOT 0.4 2020    ALKPHOS 75 04/15/2021    ALKPHOS 78 01/21/2021    ALKPHOS 76 07/02/2020    AST 34 04/15/2021    AST 31 01/21/2021    AST 46 07/02/2020    ALT 71 04/15/2021    ALT 62 01/21/2021    ALT 1.90 07/02/2020     A1C  No results found for: LABA1C  TSH  Lab Results   Component Value Date    TSH 1.90 07/02/2020    TSH 0.76 02/26/2019     FREET4  No results found for: Q0RQKSL  LIPID  Lab Results   Component Value Date    CHOL 192 04/15/2021    CHOL 202 01/21/2021    CHOL 218 07/02/2020    HDL 42 04/15/2021    HDL 43 01/21/2021    HDL 36 07/02/2020    LDLCALC 123 04/15/2021    LDLCALC 106 01/21/2021    LDLCALC 138 02/26/2019    TRIG 136 04/15/2021    TRIG 264 01/21/2021    TRIG 417 07/02/2020    CHOLHDLRATIO 6.1 07/02/2020    CHOLHDLRATIO 5.3 02/26/2019    VLDL 168 02/26/2019     VITAMIN D  No results found for: VITD25  MAGNESIUM  No results found for: MG   PHOS  No results found for: PHOS   FELIX   No results found for: FELIX  RHEUMATOID FACTOR  No results found for: RF  PSA  No results found for: PSA   HEPATITIS C  Lab Results   Component Value Date    HCVABI Non-Reactive 04/15/2021     HIV  No results found for: BOF0GIL, HIV1QT  UA  No results found for: COLORU, CLARITYU, GLUCOSEU, BILIRUBINUR, KETUA, SPECGRAV, BLOODU, PHUR, PROTEINU, UROBILINOGEN, NITRU, LEUKOCYTESUR  Urine Micro/Albumin Ratio  No results found for: MALBCR          ROS:  Const: Denies chills, fever, malaise and sweats. Eyes: Denies discharge, pain, redness and visual disturbance. ENMT: Denies earaches, other ear symptoms. Denies nasal congestion sore throat or otherwise  CV: Denies chest discomfort, pain; diaphoresis, dizziness, edema, lightheadedness, orthopnea,  palpitations, syncope and near syncopal episode or any exertional symptoms  Resp: Denies  hemoptysis, pleuritic pain, denies cough shortness of breath wheezing  GI: Denies abdominal pain, change in bowel habits, hematochezia, melena, nausea and vomiting.   : Denies urinary symptoms including dysuria , urgency, frequency or hematuria. Musculo: Denies musculoskeletal symptoms. Skin: Denies bruising, rash as above   neuro: Denies headache, numbness, stiff neck, tingling and focal weakness slurred speech or facial  droop  Hema/Lymph: Denies bleeding/bruising tendency and enlarged lymph nodes        Current Outpatient Medications:     sertraline (ZOLOFT) 50 MG tablet, Take 1.5 tablets by mouth daily, Disp: 45 tablet, Rfl: 3    albuterol sulfate  (90 Base) MCG/ACT inhaler, 1-2 puffs q4-6 hrs prn, Disp: 18 g, Rfl: 0    fluticasone (FLOVENT HFA) 110 MCG/ACT inhaler, Inhale 1 puff into the lungs 2 times daily ;  Rinse  mouth after, Disp: 12 g, Rfl: 1    fluticasone (FLONASE) 50 MCG/ACT nasal spray, 2 sprays by Each Nostril route daily ; may reduce to 1 spray each nostril daily maintenance, Disp: 1 Bottle, Rfl: 1    cetirizine (ZYRTEC) 10 MG tablet, Take 1 tablet by mouth daily as needed for Allergies, Disp: 90 tablet, Rfl: 3    EPINEPHrine (EPIPEN 2-KEREN) 0.3 MG/0.3ML SOAJ injection, Inject thigh as directed as needed anaphylaxis, may repeat if needed, Disp: 1 each, Rfl: 1    Multiple Vitamin (MULTI-VITAMIN DAILY PO), Take 1 tablet by mouth daily, Disp: , Rfl:   Allergies   Allergen Reactions    Bee Venom     Cephalexin Swelling       Past Medical History:   Diagnosis Date    Acne     Attention/concentration deficit, non-ADHD     Depression      Past Surgical History:   Procedure Laterality Date    REL OF TONGUE TIE AND CLOSURE WITH FLAP      TOE SURGERY      TONSILLECTOMY      TONSILLECTOMY AND ADENOIDECTOMY      TYMPANOSTOMY TUBE PLACEMENT      WISDOM TOOTH EXTRACTION       Family History   Problem Relation Age of Onset    Sudden Death Neg Hx      Social History     Tobacco Use    Smoking status: Never Smoker    Smokeless tobacco: Never Used   Substance Use Topics    Alcohol use: No    Drug use: No      Social History     Social History Narrative    PMH:    Health Maintenance:    Influenza Vaccination - (12/7/2015)    Tetanus Immunization - (12/7/2015)    Medical Problems:    Add    Acne - follows with [facility]    depression    Surgical Hx:    T & A, Tympanostomy Tube Insertion, [place] Tooth Extraction    Reviewed, no changes. FH:    Father:    . (Hx)    Mother:    . (Hx)    Denies CM, sudden death, congenital or otherwise        Reviewed, no changes. SH:    . (Marital) , baby on the way    Works [employer] for  of [address]: Cigarette Use: Nonsmoker - no smokers at home        Vitals:    01/05/22 1502   BP: 130/82   Pulse: 71   Temp: 97.8 °F (36.6 °C)   SpO2: 97%   Weight: 268 lb (121.6 kg)      Wt Readings from Last 3 Encounters:   01/05/22 268 lb (121.6 kg)   11/30/21 266 lb 3.2 oz (120.7 kg)   11/16/21 265 lb (120.2 kg)          Exam:  Const: Appears comfortable. No signs of acute distress present. Head/Face: Atraumatic on inspection. Eyes: EOMI in both eyes. No discharge from the eyes. PERRL. Sclerae clear. ENMT: Ears fluid nose boggy oropharynx thick postnasal drainage exudate neck: Supple. Palpation reveals no adenopathy. No masses appreciated. No JVD. Carotids: no  bruits. Resp: Respirations are unlabored. Clear to auscultation. No rales, rhonchi or wheezes appreciated  over the lungs bilaterally. CV: Rate is regular. Rhythm is regular. No gallop or rubs. No heart murmur appreciated. Extremities: No clubbing, cyanosis, or edema. No calf inflammation or tenderness. Abdomen: Bowel sounds are normoactive. Abdomen is soft, nontender, and nondistended. No  abdominal masses. No palpable hepatosplenomegaly. Lymph: No palpable or visible regional lymphadenopathy. Musculoskeletal: no acute joint inflammation. Skin: Dry and warm. Neuro: Alert and oriented. Affect: appropriate. Upper Extremities: 5/5 bilaterally. Lower Extremities:  5/5 bilaterally. Sensation intact to light touch.  Reflexes: DTR's are symmetric and 2+ bilaterally. .  Cranial Nerves: Cranial nerves grossly intact. Assessment and Plan:   Diagnosis Orders   1. Elevated LFTs     2. Dysthymia     3. Mild persistent asthma with acute exacerbation     4. Allergic rhinitis, unspecified seasonality, unspecified trigger     5. PATO (obstructive sleep apnea)     6. Mixed hyperlipidemia     7. Bronchitis     8. Acute bacterial sinusitis         Bronchitis    Counseled, resolved  Sinusitis  Counseled, resolved    Mild persistent asthma with acute exacerbation    Counseled extensively. Differential reviewed, including serious etiologies. He has Flovent at home that he is not been using, precautions reviewed including rinse mouth after and has as needed albuterol that he has not been using or needing either. This point he will merely uses albuterol tract as needed and let me know if he begins to need it, at this time no symptoms. Recent chest x-ray negative      Dysthymia  Chronic, previously saw Dr. Oziel Colby and was on Zoloft and Vyvanse. Dr. Oziel Colby turned him over to  \"adult care\". He was on no medications for a while, developed depression again. Zoloft was titrated up to 75 mg daily, at 1 point in the remote past increased on his own to 100 mg, but noticed increased irritability at this dose so went back to 75 mg and doing well on this dose. Declines psychiatry now unless symptoms persist or worsen. Adamantly  denies suicidal or homicidal ideations . recommend counseling, declines now. We did discuss  PRN hydroxyzine if needed but doing well now he states. Risk benefits reviewed including sedation not to drive or operate machinery. Paradoxical effects reviewed as well. Health maintenance examination  Health maintenance issues discussed at length  11/30/2021 . Encouraged yearly physicals. Mixed hyperlipidemia  counseled. Last triglycerides normalized, 136, , HDL 42. Continue lifestyle modification. Monitor.     Elevated LFTs   Likely fatty liver. Precautions reviewed. Risks of even fatty liver leading to cirrhosis reviewed. Lifestyle modification and appropriate diet and weight loss reviewed. Other than basic monitoring not interested in in-depth blood work or imaging, other than routine blood work. Weight loss encouraged. Monitor. ASX. Negative hepatitis A/B/C but not immune, he should repeat hepatitis B series and get hepatitis A series but defers this time. Awaiting repeat blood work      Allergic rhinitis  Continue Claritin or equivalent daily as needed plus/minus Flonase as directed as needed and has had Singulair in the past with standard precaution clean depression. Asymptomatic now          PATO  Suspected, risk of untreated PATO reviewed. Driving precaution reviewed. Avoid supine sleep. Home study set up, he canceled as he states symptoms completely resolved. No flowsheet data found. Plan as above. Counseled extensively and differential diagnoses relevant to above were reviewed, including serious etiologies. Side effects and interactions of medications were reviewed. Counseled he and his wife. Waiting on blood work I will call for results follow-up of abnormal as needed otherwise simply wants yearly physical sooner as needed. Return in about 1 year (around 1/5/2023), or if symptoms worsen or fail to improve, for physical.         Signs and symptoms to watch for discussed, serious signs and symptoms reviewed. ER if any. Over 30 minutes  spent with the patient in reviewing records, reviewing with patient/family, counseling, ordering,  prescribing, completing h&p, etc., with over 50% of the time spent face to face counseling. Shae Currie MD    Patients are advised to check with insurance company to ensure coverage and to fully understand benefits and cost prior to any testing. This note was created with the assistance of voice recognition software.   Document was reviewed however may contain

## 2022-04-08 DIAGNOSIS — F34.1 DYSTHYMIA: ICD-10-CM

## 2022-04-14 DIAGNOSIS — F34.1 DYSTHYMIA: ICD-10-CM

## 2022-04-14 RX ORDER — EPINEPHRINE 0.3 MG/.3ML
INJECTION SUBCUTANEOUS
Qty: 1 EACH | Refills: 1 | Status: SHIPPED | OUTPATIENT
Start: 2022-04-14

## 2022-11-04 ENCOUNTER — OFFICE VISIT (OUTPATIENT)
Dept: PRIMARY CARE CLINIC | Age: 27
End: 2022-11-04
Payer: COMMERCIAL

## 2022-11-04 VITALS
TEMPERATURE: 97.9 F | BODY MASS INDEX: 37.24 KG/M2 | SYSTOLIC BLOOD PRESSURE: 138 MMHG | HEART RATE: 85 BPM | DIASTOLIC BLOOD PRESSURE: 84 MMHG | OXYGEN SATURATION: 97 % | HEIGHT: 71 IN | WEIGHT: 266 LBS

## 2022-11-04 DIAGNOSIS — E78.2 MIXED HYPERLIPIDEMIA: ICD-10-CM

## 2022-11-04 DIAGNOSIS — R79.89 ELEVATED LFTS: ICD-10-CM

## 2022-11-04 DIAGNOSIS — Z23 ENCOUNTER FOR IMMUNIZATION: ICD-10-CM

## 2022-11-04 DIAGNOSIS — F32.A ANXIETY AND DEPRESSION: Primary | ICD-10-CM

## 2022-11-04 DIAGNOSIS — J45.31 MILD PERSISTENT ASTHMA WITH ACUTE EXACERBATION: ICD-10-CM

## 2022-11-04 DIAGNOSIS — J30.9 ALLERGIC RHINITIS, UNSPECIFIED SEASONALITY, UNSPECIFIED TRIGGER: ICD-10-CM

## 2022-11-04 DIAGNOSIS — F41.9 ANXIETY AND DEPRESSION: Primary | ICD-10-CM

## 2022-11-04 DIAGNOSIS — G47.33 OSA (OBSTRUCTIVE SLEEP APNEA): ICD-10-CM

## 2022-11-04 DIAGNOSIS — Z00.00 HEALTH MAINTENANCE EXAMINATION: ICD-10-CM

## 2022-11-04 PROCEDURE — 90472 IMMUNIZATION ADMIN EACH ADD: CPT | Performed by: FAMILY MEDICINE

## 2022-11-04 PROCEDURE — 90471 IMMUNIZATION ADMIN: CPT | Performed by: FAMILY MEDICINE

## 2022-11-04 PROCEDURE — 90674 CCIIV4 VAC NO PRSV 0.5 ML IM: CPT | Performed by: FAMILY MEDICINE

## 2022-11-04 PROCEDURE — 90677 PCV20 VACCINE IM: CPT | Performed by: FAMILY MEDICINE

## 2022-11-04 PROCEDURE — 99395 PREV VISIT EST AGE 18-39: CPT | Performed by: FAMILY MEDICINE

## 2022-11-04 PROCEDURE — G8482 FLU IMMUNIZE ORDER/ADMIN: HCPCS | Performed by: FAMILY MEDICINE

## 2022-11-04 SDOH — ECONOMIC STABILITY: FOOD INSECURITY: WITHIN THE PAST 12 MONTHS, YOU WORRIED THAT YOUR FOOD WOULD RUN OUT BEFORE YOU GOT MONEY TO BUY MORE.: NEVER TRUE

## 2022-11-04 SDOH — ECONOMIC STABILITY: FOOD INSECURITY: WITHIN THE PAST 12 MONTHS, THE FOOD YOU BOUGHT JUST DIDN'T LAST AND YOU DIDN'T HAVE MONEY TO GET MORE.: NEVER TRUE

## 2022-11-04 ASSESSMENT — SOCIAL DETERMINANTS OF HEALTH (SDOH): HOW HARD IS IT FOR YOU TO PAY FOR THE VERY BASICS LIKE FOOD, HOUSING, MEDICAL CARE, AND HEATING?: NOT HARD AT ALL

## 2022-11-04 ASSESSMENT — PATIENT HEALTH QUESTIONNAIRE - PHQ9
SUM OF ALL RESPONSES TO PHQ QUESTIONS 1-9: 0
3. TROUBLE FALLING OR STAYING ASLEEP: 0
SUM OF ALL RESPONSES TO PHQ QUESTIONS 1-9: 0
SUM OF ALL RESPONSES TO PHQ QUESTIONS 1-9: 0
6. FEELING BAD ABOUT YOURSELF - OR THAT YOU ARE A FAILURE OR HAVE LET YOURSELF OR YOUR FAMILY DOWN: 0
2. FEELING DOWN, DEPRESSED OR HOPELESS: 0
SUM OF ALL RESPONSES TO PHQ QUESTIONS 1-9: 0
4. FEELING TIRED OR HAVING LITTLE ENERGY: 0
5. POOR APPETITE OR OVEREATING: 0
8. MOVING OR SPEAKING SO SLOWLY THAT OTHER PEOPLE COULD HAVE NOTICED. OR THE OPPOSITE, BEING SO FIGETY OR RESTLESS THAT YOU HAVE BEEN MOVING AROUND A LOT MORE THAN USUAL: 0
7. TROUBLE CONCENTRATING ON THINGS, SUCH AS READING THE NEWSPAPER OR WATCHING TELEVISION: 0
10. IF YOU CHECKED OFF ANY PROBLEMS, HOW DIFFICULT HAVE THESE PROBLEMS MADE IT FOR YOU TO DO YOUR WORK, TAKE CARE OF THINGS AT HOME, OR GET ALONG WITH OTHER PEOPLE: 0
1. LITTLE INTEREST OR PLEASURE IN DOING THINGS: 0
SUM OF ALL RESPONSES TO PHQ9 QUESTIONS 1 & 2: 0
9. THOUGHTS THAT YOU WOULD BE BETTER OFF DEAD, OR OF HURTING YOURSELF: 0

## 2022-11-04 NOTE — PROGRESS NOTES
Celia Richmond : 1995 Sex: male  Age: 27 y.o. Chief Complaint   Patient presents with    Epistaxis    Medication Check    Immunizations     flu         HPI:  .  Lesvia Rey today with his wife. Generally doing well. He did have a [employer] work, was in [place] [facility] he states for 12 hours but healed completely without sequelae. Would like some routine blood work. Does well as long as he takes Zoloft, irritable without. Importance of taking reviewed. His wife would like him to start counseling, he is agreeable. We counseled today. No other complaints or concerns          Health Maintenance:  Proper diet reviewed including Mediterranean and DASH diets. Counseled on healthy weight, appropriate exercise, avoidance of tobacco, and recommendations for minimal to no alcohol consumption. Counseled on the potential pros and cons of vitamins and supplements. Reviewed the recommendations and risk/benefits of vaccines including, covid - counseled on vaccine, had Ples Hale x2, counseled on booster;  Td/Tdap (12/15, ), Prevnar 20-agrees, flu vaccine (d agrees), Hepatitis vaccines (had B, counseled on A), gardasil (had x 3), and shingrix (patient had varicella x 2). HIV and Hep C screening guidelines were reviewed. Importance of regular eye and dental exams and health reviewed. Risks/Benefits of ASA reviewed and discussed latest guidelines. Sun protection reviewed. Notify if any new or changing moles/skin lesions, etc. Dexa Scan indications/ risk factors for osteoporotic fractures (and associated M/M) and preventative measures reviewed. Counseled on testicular exams and prostate exams. Colonoscopy recommendations reviewed. Pt denies change in bowel habits or [address] of colon polyps/CA. Fit test discussed     Indications for EKG and  additional  cardiac testing including referrals, stress testing,  2d echo, ect. dasx.  Reviewed indications for other testing such as  PFT's.and  indications for imaging including brain, carotid, chest, abdominal, aortic .  dasx.         most Recent Labs  CBC  Lab Results   Component Value Date/Time    WBC 7.3 07/02/2020 12:00 AM    WBC 4.9 02/26/2019 12:00 AM    RBC 4.85 07/02/2020 12:00 AM    RBC 4.91 02/26/2019 12:00 AM    HGB 15.0 07/02/2020 12:00 AM    HGB 14.6 02/26/2019 12:00 AM    HCT 42.5 07/02/2020 12:00 AM    HCT 43.7 02/26/2019 12:00 AM    MCV 87.6 07/02/2020 12:00 AM    MCV 88.9 02/26/2019 12:00 AM     07/02/2020 12:00 AM     02/26/2019 12:00 AM      CMP  Lab Results   Component Value Date/Time     04/15/2021 11:24 AM     01/21/2021 11:01 AM     07/02/2020 12:00 AM    K 4.0 04/15/2021 11:24 AM    K 4.3 01/21/2021 11:01 AM    K 3.8 07/02/2020 12:00 AM     04/15/2021 11:24 AM     01/21/2021 11:01 AM     07/02/2020 12:00 AM    CO2 25 04/15/2021 11:24 AM    CO2 29 01/21/2021 11:01 AM    CO2 25 07/02/2020 12:00 AM    ANIONGAP 14 04/15/2021 11:24 AM    ANIONGAP 10 01/21/2021 11:01 AM    GLUCOSE 107 04/15/2021 11:24 AM    GLUCOSE 99 01/21/2021 11:01 AM    GLUCOSE 98 07/02/2020 12:00 AM    BUN 14 04/15/2021 11:24 AM    BUN 13 01/21/2021 11:01 AM    BUN 12 07/02/2020 12:00 AM    CREATININE 0.9 04/15/2021 11:24 AM    CREATININE 0.9 01/21/2021 11:01 AM    CREATININE 0.88 07/02/2020 12:00 AM    LABGLOM >60 04/15/2021 11:24 AM    LABGLOM >60 01/21/2021 11:01 AM    LABGLOM 125 02/26/2019 12:00 AM    GFRAA >60 04/15/2021 11:24 AM    GFRAA >60 01/21/2021 11:01 AM    CALCIUM 9.5 04/15/2021 11:24 AM    CALCIUM 9.8 01/21/2021 11:01 AM    CALCIUM 9.5 07/02/2020 12:00 AM    PROT 7.4 04/15/2021 11:24 AM    PROT 7.9 01/21/2021 11:01 AM    LABALBU 4.5 04/15/2021 11:24 AM    LABALBU 4.7 01/21/2021 11:01 AM    LABALBU 4.4 07/02/2020 12:00 AM    BILITOT 0.5 04/15/2021 11:24 AM    BILITOT 0.6 01/21/2021 11:01 AM    BILITOT 0.4 07/02/2020 12:00 AM    ALKPHOS 75 04/15/2021 11:24 AM    ALKPHOS 78 01/21/2021 11:01 AM    ALKPHOS 76 07/02/2020 12:00 AM    AST 34 04/15/2021 11:24 AM    AST 31 01/21/2021 11:01 AM    AST 46 07/02/2020 12:00 AM    ALT 71 04/15/2021 11:24 AM    ALT 62 01/21/2021 11:01 AM    ALT 1.90 07/02/2020 12:00 AM     A1C  No results found for: LABA1C  TSH  Lab Results   Component Value Date/Time    TSH 1.90 07/02/2020 12:00 AM    TSH 0.76 02/26/2019 12:00 AM     FREET4  No results found for: Y4HZIEW  LIPID  Lab Results   Component Value Date/Time    CHOL 192 04/15/2021 11:24 AM    CHOL 202 01/21/2021 11:01 AM    CHOL 218 07/02/2020 12:00 AM    HDL 42 04/15/2021 11:24 AM    HDL 43 01/21/2021 11:01 AM    HDL 36 07/02/2020 12:00 AM    LDLCALC 123 04/15/2021 11:24 AM    LDLCALC 106 01/21/2021 11:01 AM    LDLCALC 138 02/26/2019 12:00 AM    TRIG 136 04/15/2021 11:24 AM    TRIG 264 01/21/2021 11:01 AM    TRIG 417 07/02/2020 12:00 AM    CHOLHDLRATIO 6.1 07/02/2020 12:00 AM    CHOLHDLRATIO 5.3 02/26/2019 12:00 AM    VLDL 168 02/26/2019 12:00 AM     VITAMIN D  No results found for: VITD25  MAGNESIUM  No results found for: MG   PHOS  No results found for: PHOS   FELIX   No results found for: FELIX  RHEUMATOID FACTOR  No results found for: RF  PSA  No results found for: PSA   HEPATITIS C  Lab Results   Component Value Date/Time    HCVABI Non-Reactive 04/15/2021 11:24 AM     HIV  No results found for: XYS1QMO, HIV1QT  UA  No results found for: COLORU, CLARITYU, GLUCOSEU, BILIRUBINUR, KETUA, SPECGRAV, BLOODU, PHUR, PROTEINU, UROBILINOGEN, NITRU, LEUKOCYTESUR  Urine Micro/Albumin Ratio  No results found for: MALBCR          ROS:  Const: Denies chills, fever, malaise and sweats. Eyes: Denies discharge, pain, redness and visual disturbance. ENMT: Denies earaches, other ear symptoms. Denies nasal congestion, denies mouth and tongue lesions and sore throat.   CV: Denies chest discomfort, pain; diaphoresis, dizziness, edema, lightheadedness, orthopnea,  palpitations, syncope and near syncopal episode or any exertional symptoms  Resp: Denies  hemoptysis, pleuritic pain, cough chest pain shortness of breath wheezing or otherwise  GI: Denies abdominal pain, change in bowel habits, hematochezia, melena, nausea and vomiting. : Denies urinary symptoms including dysuria , urgency, frequency or hematuria. Musculo: Denies musculoskeletal symptoms.   Skin: Denies bruising, rash as above   neuro: Denies headache, numbness, stiff neck, tingling and focal weakness slurred speech or facial  droop  Hema/Lymph: Denies bleeding/bruising tendency and enlarged lymph nodes        Current Outpatient Medications:     EPINEPHrine (EPIPEN 2-KEREN) 0.3 MG/0.3ML SOAJ injection, Inject thigh as directed as needed anaphylaxis, may repeat if needed, Disp: 1 each, Rfl: 1    sertraline (ZOLOFT) 50 MG tablet, Take 1.5 tablets by mouth daily, Disp: 45 tablet, Rfl: 8    albuterol sulfate  (90 Base) MCG/ACT inhaler, 1-2 puffs q4-6 hrs prn, Disp: 18 g, Rfl: 0    fluticasone (FLONASE) 50 MCG/ACT nasal spray, 2 sprays by Each Nostril route daily ; may reduce to 1 spray each nostril daily maintenance, Disp: 1 Bottle, Rfl: 1    cetirizine (ZYRTEC) 10 MG tablet, Take 1 tablet by mouth daily as needed for Allergies, Disp: 90 tablet, Rfl: 3  Allergies   Allergen Reactions    Bee Venom     Cephalexin Swelling       Past Medical History:   Diagnosis Date    Acne     Attention/concentration deficit, non-ADHD     Depression      Past Surgical History:   Procedure Laterality Date    REL OF TONGUE TIE AND CLOSURE WITH FLAP      TOE SURGERY      TONSILLECTOMY      TONSILLECTOMY AND ADENOIDECTOMY      TYMPANOSTOMY TUBE PLACEMENT      WISDOM TOOTH EXTRACTION       Family History   Problem Relation Age of Onset    Sudden Death Neg Hx      Social History     Tobacco Use    Smoking status: Never    Smokeless tobacco: Never   Substance Use Topics    Alcohol use: No    Drug use: No      Social History     Social History Narrative    PMH:    Health Maintenance:    Influenza Vaccination - (12/7/2015)    Tetanus Immunization - (12/7/2015)    Medical Problems:    Add    Acne - follows with [facility]    depression    Surgical Hx:    T & A, Tympanostomy Tube Insertion, [place] Tooth Extraction    Reviewed, no changes. FH:    Father:    . (Hx)    Mother:    . (Hx)    Denies CM, sudden death, congenital or otherwise        Reviewed, no changes. SH:    . (Marital) , baby on the way    Works [employer] for  of [address]: Cigarette Use: Nonsmoker - no smokers at home        Vitals:    11/04/22 1408   BP: 138/84   Pulse: 85   Temp: 97.9 °F (36.6 °C)   SpO2: 97%   Weight: 266 lb (120.7 kg)   Height: 5' 11\" (1.803 m)      Wt Readings from Last 3 Encounters:   11/04/22 266 lb (120.7 kg)   01/05/22 268 lb (121.6 kg)   11/30/21 266 lb 3.2 oz (120.7 kg)          Exam:  Const: Appears comfortable. No signs of acute distress present. Head/Face: Atraumatic on inspection. Eyes: EOMI in both eyes. No discharge from the eyes. PERRL. Sclerae clear. ENMT: Ears clear nose clear oropharynx clear  Neck: Supple. Palpation reveals no adenopathy. No masses appreciated. No JVD. Carotids: no  bruits. Resp: Respirations are unlabored. Clear to auscultation. No rales, rhonchi or wheezes appreciated  over the lungs bilaterally. CV: Rate is regular. Rhythm is regular. No gallop or rubs. No heart murmur appreciated. Extremities: No clubbing, cyanosis, or edema. No calf inflammation or tenderness. Abdomen: Bowel sounds are normoactive. Abdomen is soft, nontender, and nondistended. No  abdominal masses. No palpable hepatosplenomegaly. Lymph: No palpable or visible regional lymphadenopathy. Musculoskeletal: no acute joint inflammation. Skin: Dry and warm. Neuro: Alert and oriented. Affect: appropriate. Upper Extremities: 5/5 bilaterally. Lower Extremities:  5/5 bilaterally. Sensation intact to light touch. Reflexes: DTR's are symmetric and 2+ bilaterally. .  Cranial Nerves: Cranial nerves grossly intact. Assessment and Plan:   Diagnosis Orders   1. Anxiety and depression  Amb External Referral To Psychiatry      2. Elevated LFTs        3. Mild persistent asthma with acute exacerbation        4. PATO (obstructive sleep apnea)        5. Allergic rhinitis, unspecified seasonality, unspecified trigger        6. Mixed hyperlipidemia        7. Health maintenance examination  Lipid Panel    TSH    Comprehensive Metabolic Panel    CBC with Auto Differential    Urinalysis      8. Encounter for immunization  Influenza, FLUCELVAX, (age 10 mo+), IM, Preservative Free, 0.5 mL    Pneumococcal, PCV20, PREVNAR 20, (age 25 yrs+), IM, PF              Mild persistent asthma with acute exacerbation    Counseled extensively. Differential reviewed, including serious etiologies. Uses occasional albuterol, no longer on Flovent. Indications reviewed. Prevnar 20         Anxiety/depression  Chronic, previously saw Dr. Pedro Luis Browning and was on Zoloft and Vyvanse. Dr. Pedro Luis Browning turned him over to  \"adult care\". He was on no medications for a while, developed depression again. Zoloft was titrated up to 75 mg daily, at 1 point in the remote past increased on his own to 100 mg, but noticed increased irritability at this dose so went back to 75 mg and doing well on this dose. Declines psychiatry now unless symptoms persist or worsen. Adamantly  denies suicidal or homicidal ideations . recommend counseling, agreeable. Doing well as long as he takes his medication and encouraged use as directed. . We did discuss  PRN hydroxyzine if needed but doing well now he states. Risk benefits reviewed including sedation not to drive or operate machinery. Paradoxical effects reviewed as well. Health maintenance examination  Health maintenance issues discussed at length as above, 11/4/2022. Encouraged yearly physicals. Mixed hyperlipidemia  counseled. Await repeat blood work.   Lifestyle modification reviewed. Elevated LFTs   Likely fatty liver. Precautions reviewed. Risks of even fatty liver leading to cirrhosis reviewed. Lifestyle modification and appropriate diet and weight loss reviewed. Other than basic monitoring not interested in in-depth blood work or imaging, other than routine blood work. Weight loss encouraged. Monitor. ASX. Negative hepatitis A/B/C but not immune, he should repeat hepatitis B series and get hepatitis A series but defers this time. Awaiting repeat blood work      Allergic rhinitis  He is currently on Claritin and would recommend holding if evidence of sinusitis. If no evidence of sinusitis, may resume Claritin versus Allegra versus Zyrtec as directed as needed with standard precautions. Recommended he restart Flonase with standard precautions. He is also had Singulair in the past, risk benefits reviewed including depression        PATO  Suspected, in the past, risk of untreated PATO reviewed. Driving precaution reviewed. Avoid supine sleep. Home sleep study ordered, he did not follow through and does not wish to at this time. No flowsheet data found. Plan as above. Counseled extensively and differential diagnoses relevant to above were reviewed, including serious etiologies, risks and complications, especially of left uncontrolled. If relevant, instructions and  alternatives to meds/treatment reviewed, as well as interactions, and  SE's/ADRs reviewed, notify immediately if any, discontinuing new meds if any. Plan made after discussion and shared decision making. Full health maintenance issues addressed as above as well as other medical concerns and diagnoses as above at today's visit. Compliance reviewed. Refer to counseling. Work as ordered.   Follow-up 3 weeks sooner as needed      Return for video 3wks fu; and 1 year physical.                 Educational materials and/or home exercises printed for patient's review and were included in patient instructions on his/her After Visit Summary and given to patient at the end of visit. After discussion, patient and/or guardian verbalizes understanding, agrees, feels comfortable with and wishes to proceed with above treatment plan. Call for any pending results, FU sooner if abnormal, as needed or if any current symptoms persist/worsen. Advised patient to call with any new medication issues, and read all Rx info from pharmacy to assure aware of all possible risks and side effects of medication before taking. Reviewed age and gender appropriate health screening exams and vaccinations. Advised patient regarding importance of keeping up with recommended health maintenance and to schedule as soon as possible if overdue, as this is important in assessing for undiagnosed pathology, especially cancer, as well as protecting against potentially harmful/life threatening disease. Patient and/or guardian verbalizes understanding and agrees with above counseling, assessment and plan. All questions answered. Signs and symptoms to watch for discussed, serious signs and symptoms reviewed. ER if any. Kerrie Penny MD    Patients are advised to check with insurance company to ensure coverage and to fully understand benefits and cost prior to any testing. This note was created with the assistance of voice recognition software. Document was reviewed however may contain grammatical errors.   Mixed hyperlipidemia

## 2022-11-08 ENCOUNTER — TELEPHONE (OUTPATIENT)
Dept: PRIMARY CARE CLINIC | Age: 27
End: 2022-11-08

## 2022-11-08 NOTE — TELEPHONE ENCOUNTER
I see allergy to cephalexin. I see he had augmentin in nov 2021, does he tolerate that? Some potential cross rx but if has tolerated I will call that in standard precations. Let me know.  Send back

## 2022-11-08 NOTE — TELEPHONE ENCOUNTER
Pt has been having sinus infection for 2 weeks and would like to have an antibiotic called in to [facility] in [facility].

## 2022-11-10 ENCOUNTER — PATIENT MESSAGE (OUTPATIENT)
Dept: PRIMARY CARE CLINIC | Age: 27
End: 2022-11-10

## 2022-11-10 DIAGNOSIS — B96.89 ACUTE BACTERIAL SINUSITIS: Primary | ICD-10-CM

## 2022-11-10 DIAGNOSIS — J01.90 ACUTE BACTERIAL SINUSITIS: Primary | ICD-10-CM

## 2022-11-11 RX ORDER — AMOXICILLIN AND CLAVULANATE POTASSIUM 875; 125 MG/1; MG/1
1 TABLET, FILM COATED ORAL 2 TIMES DAILY
Qty: 20 TABLET | Refills: 0 | Status: SHIPPED | OUTPATIENT
Start: 2022-11-11 | End: 2022-11-21

## 2022-11-11 NOTE — TELEPHONE ENCOUNTER
From: Toshia Shepard  To: Dr. Naman Hackett: 11/10/2022 10:26 PM EST  Subject: Allergies     Hi my Name is Jc Kaur My birthday is 1995. I have been fighting with my allergies for almost 3 weeks I need a antibiotics.

## 2022-11-23 ENCOUNTER — PATIENT MESSAGE (OUTPATIENT)
Dept: PRIMARY CARE CLINIC | Age: 27
End: 2022-11-23

## 2022-11-23 ENCOUNTER — TELEPHONE (OUTPATIENT)
Dept: PRIMARY CARE CLINIC | Age: 27
End: 2022-11-23

## 2022-11-23 NOTE — TELEPHONE ENCOUNTER
Patient has appt 11/28 needs rescheduled as provider will be out of office at that time.        Left message to return call and mychart message sent

## 2022-11-26 ENCOUNTER — HOSPITAL ENCOUNTER (OUTPATIENT)
Age: 27
Discharge: HOME | End: 2022-11-26
Payer: COMMERCIAL

## 2022-11-26 DIAGNOSIS — Z00.00 HEALTH MAINTENANCE EXAMINATION: ICD-10-CM

## 2022-11-26 LAB
ALBUMIN SERPL-MCNC: 4.4 G/DL (ref 3.5–5.2)
ALP BLD-CCNC: 76 U/L (ref 40–129)
ALT SERPL-CCNC: 89 U/L (ref 0–40)
ANION GAP SERPL CALCULATED.3IONS-SCNC: 6 MMOL/L (ref 7–16)
AST SERPL-CCNC: 37 U/L (ref 0–39)
BASOPHILS ABSOLUTE: 0.05 E9/L (ref 0–0.2)
BASOPHILS RELATIVE PERCENT: 0.7 % (ref 0–2)
BILIRUB SERPL-MCNC: 0.6 MG/DL (ref 0–1.2)
BILIRUBIN URINE: NEGATIVE
BLOOD, URINE: NEGATIVE
BUN BLDV-MCNC: 11 MG/DL (ref 6–20)
CALCIUM SERPL-MCNC: 9.8 MG/DL (ref 8.6–10.2)
CHLORIDE BLD-SCNC: 101 MMOL/L (ref 98–107)
CHOLESTEROL, TOTAL: 225 MG/DL (ref 0–199)
CLARITY: CLEAR
CO2: 28 MMOL/L (ref 22–29)
COLOR: YELLOW
CREAT SERPL-MCNC: 0.9 MG/DL (ref 0.7–1.2)
EOSINOPHILS ABSOLUTE: 0.2 E9/L (ref 0.05–0.5)
EOSINOPHILS RELATIVE PERCENT: 2.8 % (ref 0–6)
GFR SERPL CREATININE-BSD FRML MDRD: >60 ML/MIN/1.73
GLUCOSE BLD-MCNC: 89 MG/DL (ref 74–99)
GLUCOSE URINE: NEGATIVE MG/DL
HCT VFR BLD CALC: 46.7 % (ref 37–54)
HDLC SERPL-MCNC: 43 MG/DL
HEMOGLOBIN: 15.6 G/DL (ref 12.5–16.5)
IMMATURE GRANULOCYTES #: 0.03 E9/L
IMMATURE GRANULOCYTES %: 0.4 % (ref 0–5)
KETONES, URINE: NEGATIVE MG/DL
LDL CHOLESTEROL CALCULATED: 147 MG/DL (ref 0–99)
LEUKOCYTE ESTERASE, URINE: NEGATIVE
LYMPHOCYTES ABSOLUTE: 2.65 E9/L (ref 1.5–4)
LYMPHOCYTES RELATIVE PERCENT: 37.7 % (ref 20–42)
MCH RBC QN AUTO: 29.1 PG (ref 26–35)
MCHC RBC AUTO-ENTMCNC: 33.4 % (ref 32–34.5)
MCV RBC AUTO: 87.1 FL (ref 80–99.9)
MONOCYTES ABSOLUTE: 0.49 E9/L (ref 0.1–0.95)
MONOCYTES RELATIVE PERCENT: 7 % (ref 2–12)
NEUTROPHILS ABSOLUTE: 3.6 E9/L (ref 1.8–7.3)
NEUTROPHILS RELATIVE PERCENT: 51.4 % (ref 43–80)
NITRITE, URINE: NEGATIVE
PDW BLD-RTO: 12.1 FL (ref 11.5–15)
PH UA: 6 (ref 5–9)
PLATELET # BLD: 275 E9/L (ref 130–450)
PMV BLD AUTO: 9.4 FL (ref 7–12)
POTASSIUM SERPL-SCNC: 4.3 MMOL/L (ref 3.5–5)
PROTEIN UA: NEGATIVE MG/DL
RBC # BLD: 5.36 E12/L (ref 3.8–5.8)
SODIUM BLD-SCNC: 135 MMOL/L (ref 132–146)
SPECIFIC GRAVITY UA: 1.02 (ref 1–1.03)
TOTAL PROTEIN: 7.6 G/DL (ref 6.4–8.3)
TRIGL SERPL-MCNC: 176 MG/DL (ref 0–149)
TSH SERPL DL<=0.05 MIU/L-ACNC: 1.27 UIU/ML (ref 0.27–4.2)
UROBILINOGEN, URINE: 0.2 E.U./DL
VLDLC SERPL CALC-MCNC: 35 MG/DL
WBC # BLD: 7 E9/L (ref 4.5–11.5)

## 2022-11-26 PROCEDURE — 36415 COLL VENOUS BLD VENIPUNCTURE: CPT

## 2022-11-26 PROCEDURE — 81003 URINALYSIS AUTO W/O SCOPE: CPT

## 2022-11-26 PROCEDURE — 85025 COMPLETE CBC W/AUTO DIFF WBC: CPT

## 2022-11-26 PROCEDURE — 84443 ASSAY THYROID STIM HORMONE: CPT

## 2022-11-26 PROCEDURE — 80061 LIPID PANEL: CPT

## 2022-11-26 PROCEDURE — 80053 COMPREHEN METABOLIC PANEL: CPT

## 2022-11-28 NOTE — TELEPHONE ENCOUNTER
From: Johnna TERRAZAS  To: Alexa Rushing  Sent: 11/23/2022 3:57 PM EST  Subject: Upcoming Appointment    Sarah Arellano, we unfortunately need to reschedule your 11/28/22 appointment Dr Dana Acosta will be out of the office. Please call [phone] option 3 to reschedule. Thank you.

## 2022-12-06 ENCOUNTER — TELEMEDICINE (OUTPATIENT)
Dept: PRIMARY CARE CLINIC | Age: 27
End: 2022-12-06
Payer: COMMERCIAL

## 2022-12-06 DIAGNOSIS — F41.9 ANXIETY AND DEPRESSION: ICD-10-CM

## 2022-12-06 DIAGNOSIS — J30.9 ALLERGIC RHINITIS, UNSPECIFIED SEASONALITY, UNSPECIFIED TRIGGER: ICD-10-CM

## 2022-12-06 DIAGNOSIS — F32.A ANXIETY AND DEPRESSION: ICD-10-CM

## 2022-12-06 DIAGNOSIS — Z00.00 HEALTH MAINTENANCE EXAMINATION: ICD-10-CM

## 2022-12-06 DIAGNOSIS — R79.89 ELEVATED LFTS: Primary | ICD-10-CM

## 2022-12-06 DIAGNOSIS — J45.31 MILD PERSISTENT ASTHMA WITH ACUTE EXACERBATION: ICD-10-CM

## 2022-12-06 DIAGNOSIS — G47.33 OSA (OBSTRUCTIVE SLEEP APNEA): ICD-10-CM

## 2022-12-06 DIAGNOSIS — E78.2 MIXED HYPERLIPIDEMIA: ICD-10-CM

## 2022-12-06 PROCEDURE — G8427 DOCREV CUR MEDS BY ELIG CLIN: HCPCS | Performed by: FAMILY MEDICINE

## 2022-12-06 PROCEDURE — 1036F TOBACCO NON-USER: CPT | Performed by: FAMILY MEDICINE

## 2022-12-06 PROCEDURE — G8482 FLU IMMUNIZE ORDER/ADMIN: HCPCS | Performed by: FAMILY MEDICINE

## 2022-12-06 PROCEDURE — 99214 OFFICE O/P EST MOD 30 MIN: CPT | Performed by: FAMILY MEDICINE

## 2022-12-06 PROCEDURE — G8417 CALC BMI ABV UP PARAM F/U: HCPCS | Performed by: FAMILY MEDICINE

## 2022-12-06 NOTE — PROGRESS NOTES
TeleMedicine Patient Consent    This visit was performed as a virtual video visit using a synchronous, two-way, audio-video telehealth technology platform. Patient identification was verified at the start of the visit, including the patient's telephone number and physical location. I discussed with the patient the nature of our telehealth visits, that:     Due to the nature of an audio- video modality, the only components of a physical exam that could be done are the elements supported by direct observation. I would evaluate the patient and recommend diagnostics and treatments based on my assessment. If it was felt that the patient should be evaluated in clinic or an emergency room setting, then they would be directed there. Our sessions are not being recorded and that personal health information is protected. Our team would provide follow up care in person if/when the patient needs it. Patient does agree to proceed with telemedicine consultation. Patient's location: home address in [facility]    Physician  location other address in [place]     Other people involved in call:   Spouse    This visit was completed virtually using Doxy. me    2022    TELEHEALTH EVALUATION -- Audio/Visual (During [facility] public [facility] emergency)    Chief Complaint   Patient presents with    Results           HPI:    Jodi Avery (:  1995) has requested an audio/video evaluation for the following concern(s):    Presents symptoms wife for follow-up. Feels well, no complaints or concerns.   Blood work was reviewed, BMP shows anion gap 6 otherwise normal glucose 89 HDL 43 LDL up to 147 triglyceride 176 ALT up to 89 TSH 1.27 CBC with differential normal urinalysis negative      Most Recent Labs  CBC  Lab Results   Component Value Date/Time    WBC 7.0 2022 10:38 AM    WBC 7.3 2020 12:00 AM    WBC 4.9 2019 12:00 AM    RBC 5.36 2022 10:38 AM    RBC 4.85 2020 12:00 AM    RBC 4.91 2019 12:00 AM    HGB 15.6 11/26/2022 10:38 AM    HGB 15.0 07/02/2020 12:00 AM    HGB 14.6 02/26/2019 12:00 AM    HCT 46.7 11/26/2022 10:38 AM    HCT 42.5 07/02/2020 12:00 AM    HCT 43.7 02/26/2019 12:00 AM    MCV 87.1 11/26/2022 10:38 AM    MCV 87.6 07/02/2020 12:00 AM    MCV 88.9 02/26/2019 12:00 AM     11/26/2022 10:38 AM     07/02/2020 12:00 AM     02/26/2019 12:00 AM      CMP  Lab Results   Component Value Date/Time     11/26/2022 10:38 AM     04/15/2021 11:24 AM     01/21/2021 11:01 AM    K 4.3 11/26/2022 10:38 AM    K 4.0 04/15/2021 11:24 AM    K 4.3 01/21/2021 11:01 AM     11/26/2022 10:38 AM     04/15/2021 11:24 AM     01/21/2021 11:01 AM    CO2 28 11/26/2022 10:38 AM    CO2 25 04/15/2021 11:24 AM    CO2 29 01/21/2021 11:01 AM    ANIONGAP 6 11/26/2022 10:38 AM    ANIONGAP 14 04/15/2021 11:24 AM    ANIONGAP 10 01/21/2021 11:01 AM    GLUCOSE 89 11/26/2022 10:38 AM    GLUCOSE 107 04/15/2021 11:24 AM    GLUCOSE 99 01/21/2021 11:01 AM    BUN 11 11/26/2022 10:38 AM    BUN 14 04/15/2021 11:24 AM    BUN 13 01/21/2021 11:01 AM    CREATININE 0.9 11/26/2022 10:38 AM    CREATININE 0.9 04/15/2021 11:24 AM    CREATININE 0.9 01/21/2021 11:01 AM    LABGLOM >60 11/26/2022 10:38 AM    LABGLOM >60 04/15/2021 11:24 AM    LABGLOM >60 01/21/2021 11:01 AM    LABGLOM 125 02/26/2019 12:00 AM    GFRAA >60 04/15/2021 11:24 AM    GFRAA >60 01/21/2021 11:01 AM    CALCIUM 9.8 11/26/2022 10:38 AM    CALCIUM 9.5 04/15/2021 11:24 AM    CALCIUM 9.8 01/21/2021 11:01 AM    PROT 7.6 11/26/2022 10:38 AM    PROT 7.4 04/15/2021 11:24 AM    PROT 7.9 01/21/2021 11:01 AM    LABALBU 4.4 11/26/2022 10:38 AM    LABALBU 4.5 04/15/2021 11:24 AM    LABALBU 4.7 01/21/2021 11:01 AM    BILITOT 0.6 11/26/2022 10:38 AM    BILITOT 0.5 04/15/2021 11:24 AM    BILITOT 0.6 01/21/2021 11:01 AM    ALKPHOS 76 11/26/2022 10:38 AM    ALKPHOS 75 04/15/2021 11:24 AM    ALKPHOS 78 01/21/2021 11:01 AM    AST 37 11/26/2022 10:38 AM    AST 34 04/15/2021 11:24 AM    AST 31 01/21/2021 11:01 AM    ALT 89 11/26/2022 10:38 AM    ALT 71 04/15/2021 11:24 AM    ALT 62 01/21/2021 11:01 AM     A1C  No results found for: LABA1C  TSH  Lab Results   Component Value Date/Time    TSH 1.270 11/26/2022 10:38 AM    TSH 1.90 07/02/2020 12:00 AM    TSH 0.76 02/26/2019 12:00 AM     FREET4  No results found for: K2ENKOE  LIPID  Lab Results   Component Value Date/Time    CHOL 225 11/26/2022 10:38 AM    CHOL 192 04/15/2021 11:24 AM    CHOL 202 01/21/2021 11:01 AM    HDL 43 11/26/2022 10:38 AM    HDL 42 04/15/2021 11:24 AM    HDL 43 01/21/2021 11:01 AM    LDLCALC 147 11/26/2022 10:38 AM    LDLCALC 123 04/15/2021 11:24 AM    LDLCALC 106 01/21/2021 11:01 AM    TRIG 176 11/26/2022 10:38 AM    TRIG 136 04/15/2021 11:24 AM    TRIG 264 01/21/2021 11:01 AM    CHOLHDLRATIO 6.1 07/02/2020 12:00 AM    CHOLHDLRATIO 5.3 02/26/2019 12:00 AM    VLDL 168 02/26/2019 12:00 AM     VITAMIN D  No results found for: VITD25  MAGNESIUM  No results found for: MG   PHOS  No results found for: PHOS   FELIX   No results found for: FELIX  RHEUMATOID FACTOR  No results found for: RF  PSA  No results found for: PSA   HEPATITIS C  Lab Results   Component Value Date/Time    HCVABI Non-Reactive 04/15/2021 11:24 AM     HIV  No results found for: KYA5DSK, HIV1QT  UA  Lab Results   Component Value Date/Time    COLORU Yellow 11/26/2022 10:32 AM    CLARITYU Clear 11/26/2022 10:32 AM    GLUCOSEU Negative 11/26/2022 10:32 AM    BILIRUBINUR Negative 11/26/2022 10:32 AM    KETUA Negative 11/26/2022 10:32 AM    SPECGRAV 1.020 11/26/2022 10:32 AM    BLOODU Negative 11/26/2022 10:32 AM    PHUR 6.0 11/26/2022 10:32 AM    PROTEINU Negative 11/26/2022 10:32 AM    UROBILINOGEN 0.2 11/26/2022 10:32 AM    NITRU Negative 11/26/2022 10:32 AM    LEUKOCYTESUR Negative 11/26/2022 10:32 AM     Urine Micro/Albumin Ratio  No results found for: MALBCR      ROS:  Const: Denies chills, fever, malaise and sweats.   Eyes: Denies discharge, pain, redness and visual disturbance. ENMT: Denies earaches, other ear symptoms. Denies nasal or sinus symptoms other than stated  above. Denies mouth and tongue lesions and sore throat. CV: Denies chest discomfort, pain; diaphoresis, dizziness, edema, lightheadedness, orthopnea,  palpitations, syncope and near syncopal episode or any exertional symptoms  Resp: Denies cough, hemoptysis, pleuritic pain, SOB, sputum production and wheezing. GI: Denies abdominal pain, change in bowel habits, hematochezia, melena, nausea and vomiting. : Denies urinary symptoms including dysuria , urgency, frequency or hematuria. Musculo: Denies musculoskeletal symptoms. Skin: Denies bruising and rash.   Neuro: Denies headache, numbness, stiff neck, tingling and focal weakness slurred speech or facial  droop  Hema/Lymph: Denies bleeding/bruising tendency and enlarged lymph nodes         Current Outpatient Medications:     EPINEPHrine (EPIPEN 2-KEREN) 0.3 MG/0.3ML SOAJ injection, Inject thigh as directed as needed anaphylaxis, may repeat if needed, Disp: 1 each, Rfl: 1    sertraline (ZOLOFT) 50 MG tablet, Take 1.5 tablets by mouth daily, Disp: 45 tablet, Rfl: 8    albuterol sulfate  (90 Base) MCG/ACT inhaler, 1-2 puffs q4-6 hrs prn, Disp: 18 g, Rfl: 0    fluticasone (FLONASE) 50 MCG/ACT nasal spray, 2 sprays by Each Nostril route daily ; may reduce to 1 spray each nostril daily maintenance, Disp: 1 Bottle, Rfl: 1    cetirizine (ZYRTEC) 10 MG tablet, Take 1 tablet by mouth daily as needed for Allergies, Disp: 90 tablet, Rfl: 3  Allergies   Allergen Reactions    Bee Venom     Cephalexin Swelling       Past Medical History:   Diagnosis Date    Acne     Attention/concentration deficit, non-ADHD     Depression      Past Surgical History:   Procedure Laterality Date    REL OF TONGUE TIE AND CLOSURE WITH FLAP      TOE SURGERY      TONSILLECTOMY      TONSILLECTOMY AND ADENOIDECTOMY      TYMPANOSTOMY TUBE PLACEMENT WISDOM TOOTH EXTRACTION       Family History   Problem Relation Age of Onset    Sudden Death Neg Hx      Social History     Tobacco Use    Smoking status: Never    Smokeless tobacco: Never   Substance Use Topics    Alcohol use: No    Drug use: No     Social History     Social History Narrative    PMH:    Health Maintenance:    Influenza Vaccination - (12/7/2015)    Tetanus Immunization - (12/7/2015)    Medical Problems:    Add    Acne - follows with advanced dermatology    depression    Surgical Hx:    T & A, Tympanostomy Tube Insertion, [place] Tooth Extraction    Reviewed, no changes. FH:    Father:    . (Hx)    Mother:    . (Hx)    Denies CM, sudden death, congenital or otherwise        Reviewed, no changes. SH:    . (Marital) , baby on the way    Works [employer] for [address] [place] [address] [address]: Cigarette Use: Nonsmoker - no smokers at home                 PHYSICAL EXAMINATION:  [ INSTRUCTIONS:  \"[x]\" Indicates a positive item  \"[]\" Indicates a negative item  -- DELETE ALL ITEMS NOT EXAMINED]  Vital Signs: (As obtained by patient/caregiver or practitioner observation)    There were no vitals filed for this visit. Blood pressure-  Heart rate-    Respiratory rate-    Temperature-  Pulse oximetry-     Constitutional: [x] Appears well-developed and well-nourished [x] No apparent distress      [] Abnormal-   Mental status  [x] Alert and awake  [x] Oriented to person/place/time [x]Able to follow commands      Eyes:  EOM    [x]  Normal  [] Abnormal-  Sclera  [x]  Normal  [] Abnormal -         Discharge [x]  None visible  [] Abnormal -    HENT:   [x] Normocephalic, atraumatic.   [] Abnormal   [x] Mouth/Throat: Mucous membranes are moist.     External Ears [x] Normal  [] Abnormal-     Neck: [x] No visualized mass     Pulmonary/Chest: [x] Respiratory effort normal.  [x] No visualized signs of difficulty breathing or respiratory distress        [] Abnormal-      Musculoskeletal:   [x] Normal gait with no signs of ataxia         [x] Normal range of motion of neck        [] Abnormal-       Neurological:        [x] No Facial Asymmetry (Cranial nerve 7 motor function) (limited exam to video visit)          [x] No gaze palsy        [] Abnormal-         Skin:        [x] No significant exanthematous lesions or discoloration noted on facial skin         [] Abnormal-            Psychiatric:       [x] Normal Affect [x] No Hallucinations        [] Abnormal-     Other pertinent observable physical exam findings-     ASSESSMENT/PLAN:   Diagnosis Orders   1. Elevated LFTs  Comprehensive Metabolic Panel      2. Mixed hyperlipidemia  Lipid Panel    Comprehensive Metabolic Panel      3. Anxiety and depression        4. PATO (obstructive sleep apnea)        5. Health maintenance examination        6. Mild persistent asthma with acute exacerbation        7. Allergic rhinitis, unspecified seasonality, unspecified trigger            No problem-specific Assessment & Plan notes found for this encounter. Mild persistent asthma with acute exacerbation    Counseled extensively. Differential reviewed, including serious etiologies. Uses occasional albuterol, no longer on Flovent. Indications reviewed. Doing very well now       Anxiety/depression  Chronic, previously saw Dr. Jamie Eddy and was on Zoloft and Vyvanse. Dr. Jamie Eddy turned him over to  \"adult care\". He was on no medications for a while, developed depression again. Zoloft was titrated up to 75 mg daily, at 1 point in the remote past increased on his own to 100 mg, but noticed increased irritability at this dose so went back to 75 mg and doing well on this dose. Declines psychiatry now unless symptoms persist or worsen. Adamantly  denies suicidal or homicidal ideations . recommend counseling, agreeable. Doing well as long as he takes his medication and encouraged use as directed. . We did discuss  PRN hydroxyzine if needed but doing well now he states. Risk benefits reviewed including sedation not to drive or operate machinery. Paradoxical effects reviewed as well. Overall doing very well now on Zoloft 75 mg daily    Health maintenance examination  Health maintenance issues discussed at length 11/4/2022. Encouraged yearly physicals. Mixed hyperlipidemia  counseled. Risk of hyperlipidemia reviewed. Goals reviewed. Risk benefits of meds reviewed. Lifestyle modification emphasized. Simply wants to encourage diet and recheck 6 months sooner as needed      Elevated LFTs   Likely fatty liver. Precautions reviewed. Risks of even fatty liver leading to cirrhosis reviewed. Lifestyle modification and appropriate diet and weight loss reviewed. Other than basic monitoring not interested in in-depth blood work or imaging, other than routine blood work. Weight loss encouraged. Monitor. ASX. Negative hepatitis A/B/C but not immune, he should repeat hepatitis B series and get hepatitis A series but defers at this time. Continues to decline further work-up simply wants repeat in 6 months sooner as needed. Allergic rhinitis  He is currently on Claritin and would recommend holding if evidence of sinusitis. If no evidence of sinusitis, may resume Claritin versus Allegra versus Zyrtec as directed as needed with standard precautions. Recommended he restart Flonase with standard precautions. He is also had Singulair in the past, risk benefits reviewed including depression        PATO  Suspect in the past, symptoms completely resolved, risk of untreated PATO reviewed. Driving precaution reviewed. Avoid supine sleep. Home sleep study ordered, he did not follow through and does not wish to at this time. Plan as above. Counseled extensively and differential diagnoses relevant to above were reviewed, including serious etiologies, risks and complications, especially of left uncontrolled.   If relevant, instructions and alternatives to meds/treatment reviewed, as well as interactions, and  SE's/ADRs reviewed, notify immediately if any, discontinuing new meds if any. Plan made after discussion and shared decision making. Lifestyle modification. Continue current therapy. Otherwise simply wants blood work and follow-up in 6 months sooner as needed    As long as symptoms steadily improve/resolve and medical conditions are following the expected course, FU as below, sooner PRN      Return in about 6 months (around 6/6/2023), or if symptoms worsen or fail to improve. Educational materials and/or home exercises printed for patient's review and were included in patient instructions on his/her After Visit Summary and given to patient at the end of visit. After discussion, patient and/or guardian verbalizes understanding, agrees, feels comfortable with and wishes to proceed with above treatment plan. Call for any pending results, FU sooner if abnormal, as needed or if any current symptoms persist/worsen. Advised patient to call with any new medication issues, and read all Rx info from pharmacy to assure aware of all possible risks and side effects of medication before taking. Reviewed age and gender appropriate health screening exams and vaccinations. Advised patient regarding importance of keeping up with recommended health maintenance and to schedule as soon as possible if overdue, as this is important in assessing for undiagnosed pathology, especially cancer, as well as protecting against potentially harmful/life threatening disease. Patient and/or guardian verbalizes understanding and agrees with above counseling, assessment and plan. All questions answered. Signs and symptoms to watch for discussed, serious signs and symptoms reviewed. ER if any.            Time spent: Greater than Not billed by [facility] [place], was evaluated through a synchronous (real-time) audio-video encounter. The patient (or guardian if applicable) is aware that this is a billable service, which includes applicable co-pays. This Virtual Visit was conducted with patient's (and/or legal guardian's) consent. The visit was conducted pursuant to the emergency declaration under the [address] [place] [address] authority and the [employer] Act. Patient identification was verified, and a caregiver was present when appropriate. The patient was located in a state where the provider was licensed to provide care. The patient (and/or legal guardian) has also been advised to contact this office for worsening conditions or problems, and seek emergency medical treatment and/or call 911 if deemed necessary. As this was a virtual encounter, patient (and /or legal guardian) was instructed on various ways to be seen in person. Patients are advised to check with insurance company to ensure coverage and to fully understand benefits and cost prior to any testing to try to avoid unexpected charges. This note was created with the assistance of voice recognition software. Inadvertent errors may be present. Signs and symptoms to watch for were discussed. Serious signs and symptoms reviewed. ER if any    --Deuce Greene MD on 12/6/2022 at 5:44 PM    An electronic signature was used to authenticate this note.

## 2023-01-22 DIAGNOSIS — F34.1 DYSTHYMIA: ICD-10-CM

## 2023-03-19 ENCOUNTER — APPOINTMENT (OUTPATIENT)
Dept: CT IMAGING | Age: 28
End: 2023-03-19
Payer: COMMERCIAL

## 2023-03-19 ENCOUNTER — APPOINTMENT (OUTPATIENT)
Dept: GENERAL RADIOLOGY | Age: 28
End: 2023-03-19
Payer: COMMERCIAL

## 2023-03-19 ENCOUNTER — HOSPITAL ENCOUNTER (EMERGENCY)
Age: 28
Discharge: HOME | End: 2023-03-19
Attending: EMERGENCY MEDICINE
Payer: COMMERCIAL

## 2023-03-19 ENCOUNTER — OFFICE VISIT (OUTPATIENT)
Dept: FAMILY MEDICINE CLINIC | Age: 28
End: 2023-03-19
Payer: COMMERCIAL

## 2023-03-19 VITALS
SYSTOLIC BLOOD PRESSURE: 157 MMHG | HEART RATE: 68 BPM | WEIGHT: 212 LBS | DIASTOLIC BLOOD PRESSURE: 109 MMHG | RESPIRATION RATE: 19 BRPM | TEMPERATURE: 97.2 F | BODY MASS INDEX: 28.71 KG/M2 | HEIGHT: 72 IN | OXYGEN SATURATION: 100 %

## 2023-03-19 VITALS
RESPIRATION RATE: 16 BRPM | HEIGHT: 71 IN | DIASTOLIC BLOOD PRESSURE: 106 MMHG | HEART RATE: 81 BPM | BODY MASS INDEX: 37.24 KG/M2 | TEMPERATURE: 98.2 F | OXYGEN SATURATION: 98 % | WEIGHT: 266 LBS | SYSTOLIC BLOOD PRESSURE: 158 MMHG

## 2023-03-19 DIAGNOSIS — R00.2 HEART PALPITATIONS: ICD-10-CM

## 2023-03-19 DIAGNOSIS — R07.9 CHEST PAIN, UNSPECIFIED TYPE: Primary | ICD-10-CM

## 2023-03-19 DIAGNOSIS — R03.0 ELEVATED BLOOD PRESSURE READING: ICD-10-CM

## 2023-03-19 DIAGNOSIS — R07.89 CHEST TIGHTNESS: Primary | ICD-10-CM

## 2023-03-19 DIAGNOSIS — E04.1 THYROID NODULE: ICD-10-CM

## 2023-03-19 DIAGNOSIS — R06.02 SHORTNESS OF BREATH: ICD-10-CM

## 2023-03-19 LAB
ALBUMIN SERPL-MCNC: 4.1 G/DL (ref 3.5–5.2)
ALP SERPL-CCNC: 70 U/L (ref 40–129)
ALT SERPL-CCNC: 54 U/L (ref 0–40)
ANION GAP SERPL CALCULATED.3IONS-SCNC: 10 MMOL/L (ref 7–16)
AST SERPL-CCNC: 27 U/L (ref 0–39)
BASOPHILS # BLD: 0.03 E9/L (ref 0–0.2)
BASOPHILS NFR BLD: 0.4 % (ref 0–2)
BILIRUB SERPL-MCNC: 0.2 MG/DL (ref 0–1.2)
BUN SERPL-MCNC: 11 MG/DL (ref 6–20)
CALCIUM SERPL-MCNC: 9.3 MG/DL (ref 8.6–10.2)
CHLORIDE SERPL-SCNC: 103 MMOL/L (ref 98–107)
CO2 SERPL-SCNC: 28 MMOL/L (ref 22–29)
CREAT SERPL-MCNC: 0.8 MG/DL (ref 0.7–1.2)
EOSINOPHIL # BLD: 0.12 E9/L (ref 0.05–0.5)
EOSINOPHIL NFR BLD: 1.6 % (ref 0–6)
ERYTHROCYTE [DISTWIDTH] IN BLOOD BY AUTOMATED COUNT: 12.1 FL (ref 11.5–15)
GLUCOSE SERPL-MCNC: 101 MG/DL (ref 74–99)
HCT VFR BLD AUTO: 42.8 % (ref 37–54)
HGB BLD-MCNC: 15.1 G/DL (ref 12.5–16.5)
IMM GRANULOCYTES # BLD: 0.03 E9/L
IMM GRANULOCYTES NFR BLD: 0.4 % (ref 0–5)
LYMPHOCYTES # BLD: 2.11 E9/L (ref 1.5–4)
LYMPHOCYTES NFR BLD: 29 % (ref 20–42)
Lab: NORMAL
MCH RBC QN AUTO: 31 PG (ref 26–35)
MCHC RBC AUTO-ENTMCNC: 35.3 % (ref 32–34.5)
MCV RBC AUTO: 87.9 FL (ref 80–99.9)
MONOCYTES # BLD: 0.5 E9/L (ref 0.1–0.95)
MONOCYTES NFR BLD: 6.9 % (ref 2–12)
NEUTROPHILS # BLD: 4.49 E9/L (ref 1.8–7.3)
NEUTS SEG NFR BLD: 61.7 % (ref 43–80)
PERFORMING INSTRUMENT: NORMAL
PLATELET # BLD AUTO: 268 E9/L (ref 130–450)
PMV BLD AUTO: 9.3 FL (ref 7–12)
POTASSIUM SERPL-SCNC: 4.2 MMOL/L (ref 3.5–5)
PROT SERPL-MCNC: 7.6 G/DL (ref 6.4–8.3)
QC PASS/FAIL: NORMAL
RBC # BLD AUTO: 4.87 E12/L (ref 3.8–5.8)
SARS-COV-2, POC: NORMAL
SODIUM SERPL-SCNC: 141 MMOL/L (ref 132–146)
T4 SERPL-MCNC: 6.2 MCG/DL (ref 4.5–11.7)
TROPONIN, HIGH SENSITIVITY: <6 NG/L (ref 0–11)
TSH SERPL-MCNC: 1.62 UIU/ML (ref 0.27–4.2)
WBC # BLD: 7.3 E9/L (ref 4.5–11.5)

## 2023-03-19 PROCEDURE — 99214 OFFICE O/P EST MOD 30 MIN: CPT | Performed by: NURSE PRACTITIONER

## 2023-03-19 PROCEDURE — 71275 CT ANGIOGRAPHY CHEST: CPT

## 2023-03-19 PROCEDURE — 96374 THER/PROPH/DIAG INJ IV PUSH: CPT

## 2023-03-19 PROCEDURE — 6360000004 HC RX CONTRAST MEDICATION: Performed by: RADIOLOGY

## 2023-03-19 PROCEDURE — 1036F TOBACCO NON-USER: CPT | Performed by: NURSE PRACTITIONER

## 2023-03-19 PROCEDURE — 93000 ELECTROCARDIOGRAM COMPLETE: CPT | Performed by: NURSE PRACTITIONER

## 2023-03-19 PROCEDURE — 71045 X-RAY EXAM CHEST 1 VIEW: CPT

## 2023-03-19 PROCEDURE — 6360000002 HC RX W HCPCS: Performed by: EMERGENCY MEDICINE

## 2023-03-19 PROCEDURE — 84484 ASSAY OF TROPONIN QUANT: CPT

## 2023-03-19 PROCEDURE — 80053 COMPREHEN METABOLIC PANEL: CPT

## 2023-03-19 PROCEDURE — 84443 ASSAY THYROID STIM HORMONE: CPT

## 2023-03-19 PROCEDURE — 93005 ELECTROCARDIOGRAM TRACING: CPT | Performed by: PHYSICIAN ASSISTANT

## 2023-03-19 PROCEDURE — G8417 CALC BMI ABV UP PARAM F/U: HCPCS | Performed by: NURSE PRACTITIONER

## 2023-03-19 PROCEDURE — 74174 CTA ABD&PLVS W/CONTRAST: CPT

## 2023-03-19 PROCEDURE — 2580000003 HC RX 258: Performed by: EMERGENCY MEDICINE

## 2023-03-19 PROCEDURE — 99285 EMERGENCY DEPT VISIT HI MDM: CPT

## 2023-03-19 PROCEDURE — G8482 FLU IMMUNIZE ORDER/ADMIN: HCPCS | Performed by: NURSE PRACTITIONER

## 2023-03-19 PROCEDURE — G8427 DOCREV CUR MEDS BY ELIG CLIN: HCPCS | Performed by: NURSE PRACTITIONER

## 2023-03-19 PROCEDURE — 85025 COMPLETE CBC W/AUTO DIFF WBC: CPT

## 2023-03-19 PROCEDURE — 84436 ASSAY OF TOTAL THYROXINE: CPT

## 2023-03-19 PROCEDURE — 87426 SARSCOV CORONAVIRUS AG IA: CPT | Performed by: NURSE PRACTITIONER

## 2023-03-19 RX ORDER — KETOROLAC TROMETHAMINE 30 MG/ML
30 INJECTION, SOLUTION INTRAMUSCULAR; INTRAVENOUS ONCE
Status: COMPLETED | OUTPATIENT
Start: 2023-03-19 | End: 2023-03-19

## 2023-03-19 RX ORDER — EPINEPHRINE 0.3 MG/.3ML
INJECTION SUBCUTANEOUS
Qty: 1 EACH | Refills: 1 | Status: CANCELLED | OUTPATIENT
Start: 2023-03-19

## 2023-03-19 RX ORDER — 0.9 % SODIUM CHLORIDE 0.9 %
1000 INTRAVENOUS SOLUTION INTRAVENOUS ONCE
Status: COMPLETED | OUTPATIENT
Start: 2023-03-19 | End: 2023-03-19

## 2023-03-19 RX ADMIN — SODIUM CHLORIDE 1000 ML: 9 INJECTION, SOLUTION INTRAVENOUS at 11:20

## 2023-03-19 RX ADMIN — KETOROLAC TROMETHAMINE 30 MG: 30 INJECTION, SOLUTION INTRAMUSCULAR; INTRAVENOUS at 11:26

## 2023-03-19 RX ADMIN — IOPAMIDOL 75 ML: 755 INJECTION, SOLUTION INTRAVENOUS at 12:49

## 2023-03-19 ASSESSMENT — PAIN DESCRIPTION - FREQUENCY: FREQUENCY: CONTINUOUS

## 2023-03-19 ASSESSMENT — PAIN DESCRIPTION - ORIENTATION: ORIENTATION: MID

## 2023-03-19 ASSESSMENT — PAIN DESCRIPTION - PAIN TYPE: TYPE: ACUTE PAIN

## 2023-03-19 ASSESSMENT — PAIN - FUNCTIONAL ASSESSMENT: PAIN_FUNCTIONAL_ASSESSMENT: NONE - DENIES PAIN

## 2023-03-19 ASSESSMENT — PAIN DESCRIPTION - LOCATION
LOCATION: CHEST
LOCATION: CHEST

## 2023-03-19 ASSESSMENT — PAIN DESCRIPTION - DESCRIPTORS: DESCRIPTORS: TIGHTNESS

## 2023-03-19 ASSESSMENT — PAIN SCALES - GENERAL: PAINLEVEL_OUTOF10: 8

## 2023-03-19 NOTE — ED NOTES
Did orthostatic blood pressure when he sat up he felt a little different and when he stood up he flet a little different. But it went well.       Farhat Dominique RN  03/19/23 [id number]

## 2023-03-19 NOTE — Clinical Note
Tyree Grissom was seen and treated in our emergency department on 3/19/2023. He may return to work on 03/20/2023. If you have any questions or concerns, please don't hesitate to call.       Gio Monroe, DO

## 2023-03-20 ENCOUNTER — OFFICE VISIT (OUTPATIENT)
Dept: PRIMARY CARE CLINIC | Age: 28
End: 2023-03-20

## 2023-03-20 VITALS
HEART RATE: 90 BPM | TEMPERATURE: 98.2 F | SYSTOLIC BLOOD PRESSURE: 148 MMHG | DIASTOLIC BLOOD PRESSURE: 90 MMHG | BODY MASS INDEX: 28.75 KG/M2 | OXYGEN SATURATION: 98 % | WEIGHT: 212 LBS

## 2023-03-20 DIAGNOSIS — J45.31 MILD PERSISTENT ASTHMA WITH ACUTE EXACERBATION: ICD-10-CM

## 2023-03-20 DIAGNOSIS — F41.9 ANXIETY AND DEPRESSION: ICD-10-CM

## 2023-03-20 DIAGNOSIS — Z00.00 HEALTH MAINTENANCE EXAMINATION: ICD-10-CM

## 2023-03-20 DIAGNOSIS — G47.33 OSA (OBSTRUCTIVE SLEEP APNEA): ICD-10-CM

## 2023-03-20 DIAGNOSIS — F32.A ANXIETY AND DEPRESSION: ICD-10-CM

## 2023-03-20 DIAGNOSIS — E78.2 MIXED HYPERLIPIDEMIA: ICD-10-CM

## 2023-03-20 DIAGNOSIS — R00.2 PALPITATIONS: Primary | ICD-10-CM

## 2023-03-20 DIAGNOSIS — R79.89 ELEVATED LFTS: ICD-10-CM

## 2023-03-20 DIAGNOSIS — E04.1 THYROID NODULE: ICD-10-CM

## 2023-03-20 DIAGNOSIS — I16.0 HYPERTENSIVE URGENCY: ICD-10-CM

## 2023-03-20 RX ORDER — METOPROLOL SUCCINATE 25 MG/1
25 TABLET, EXTENDED RELEASE ORAL DAILY
Qty: 30 TABLET | Refills: 1 | Status: SHIPPED
Start: 2023-03-20 | End: 2023-03-24 | Stop reason: SDUPTHER

## 2023-03-20 SDOH — ECONOMIC STABILITY: HOUSING INSECURITY
IN THE LAST 12 MONTHS, WAS THERE A TIME WHEN YOU DID NOT HAVE A STEADY PLACE TO SLEEP OR SLEPT IN A SHELTER (INCLUDING NOW)?: NO

## 2023-03-20 SDOH — ECONOMIC STABILITY: INCOME INSECURITY: HOW HARD IS IT FOR YOU TO PAY FOR THE VERY BASICS LIKE FOOD, HOUSING, MEDICAL CARE, AND HEATING?: NOT HARD AT ALL

## 2023-03-20 SDOH — ECONOMIC STABILITY: FOOD INSECURITY: WITHIN THE PAST 12 MONTHS, THE FOOD YOU BOUGHT JUST DIDN'T LAST AND YOU DIDN'T HAVE MONEY TO GET MORE.: NEVER TRUE

## 2023-03-20 SDOH — ECONOMIC STABILITY: FOOD INSECURITY: WITHIN THE PAST 12 MONTHS, YOU WORRIED THAT YOUR FOOD WOULD RUN OUT BEFORE YOU GOT MONEY TO BUY MORE.: NEVER TRUE

## 2023-03-20 ASSESSMENT — PATIENT HEALTH QUESTIONNAIRE - PHQ9
2. FEELING DOWN, DEPRESSED OR HOPELESS: 0
SUM OF ALL RESPONSES TO PHQ9 QUESTIONS 1 & 2: 0
SUM OF ALL RESPONSES TO PHQ QUESTIONS 1-9: 0
3. TROUBLE FALLING OR STAYING ASLEEP: 0
SUM OF ALL RESPONSES TO PHQ QUESTIONS 1-9: 0
SUM OF ALL RESPONSES TO PHQ QUESTIONS 1-9: 0
1. LITTLE INTEREST OR PLEASURE IN DOING THINGS: 0
SUM OF ALL RESPONSES TO PHQ QUESTIONS 1-9: 0
8. MOVING OR SPEAKING SO SLOWLY THAT OTHER PEOPLE COULD HAVE NOTICED. OR THE OPPOSITE, BEING SO FIGETY OR RESTLESS THAT YOU HAVE BEEN MOVING AROUND A LOT MORE THAN USUAL: 0
10. IF YOU CHECKED OFF ANY PROBLEMS, HOW DIFFICULT HAVE THESE PROBLEMS MADE IT FOR YOU TO DO YOUR WORK, TAKE CARE OF THINGS AT HOME, OR GET ALONG WITH OTHER PEOPLE: 0
7. TROUBLE CONCENTRATING ON THINGS, SUCH AS READING THE NEWSPAPER OR WATCHING TELEVISION: 0
6. FEELING BAD ABOUT YOURSELF - OR THAT YOU ARE A FAILURE OR HAVE LET YOURSELF OR YOUR FAMILY DOWN: 0
9. THOUGHTS THAT YOU WOULD BE BETTER OFF DEAD, OR OF HURTING YOURSELF: 0
5. POOR APPETITE OR OVEREATING: 0
4. FEELING TIRED OR HAVING LITTLE ENERGY: 0

## 2023-03-20 NOTE — PROGRESS NOTES
with no rash. Skin normal to inspection and palpation overall. Neuro: Alert and oriented. Affect: appropriate. Upper Extremities: 5/5 bilaterally. Lower Extremities:  5/5 bilaterally. Sensation intact to light touch. Reflexes: DTR's are symmetric and 2+ bilaterally. .  Cranial Nerves: Cranial nerves grossly intact. Office Labs This Visit :  No results found for this visit on 03/20/23. EKG done and reviewed  Assessment and Plan:   Diagnosis Orders   1. Palpitations  EKG 12 Lead    ECHO Complete 2D W Doppler W Color    Cardiac event monitor    metoprolol succinate (TOPROL XL) 25 MG extended release tablet    Amb External Referral To Cardiology      2. Thyroid nodule  US HEAD NECK SOFT TISSUE THYROID      3. Anxiety and depression        4. Hypertensive urgency  CATECHOLAMINES, FRACTIONATED, PLASMA    US RETROPERITONEAL MAHOGANY    ECHO Complete 2D W Doppler W Color    metoprolol succinate (TOPROL XL) 25 MG extended release tablet    Amb External Referral To Cardiology      5. Elevated LFTs        6. Mixed hyperlipidemia        7. PATO (obstructive sleep apnea)        8. Health maintenance examination        9. Mild persistent asthma with acute exacerbation            Palpitations    Counseled extensively. Differential reviewed, including serious etiologies. Check echo, event monitor [facility]. Start metoprolol with precautions as below. Red flag symptoms watch were reviewed, ER if any serious signs or symptoms. Thyroid nodule    Counseled extensively. Differential reviewed, including serious etiologies. Statistics reviewed. Check thyroid ultrasound if positive they would like referred [facility] or Dr. Rita Upton and may need biopsy    Hypertensive urgency    counseled, differential reviewed, counseled on primary versus secondary. Risk of reviewed. Willing to have serum catecholamines and renal artery ultrasound checked.   Start

## 2023-03-20 NOTE — LETTER
March 20, 2023       Hannah Ugarte YOB: 1995   [address] Date of Visit:  3/20/2023       To Whom It May Concern:    Lawyer Dukes was seen in my clinic on 3/20/2023. He may return to work on 03/27/23. If you have any questions or concerns, please don't hesitate to call.     Sincerely,        Diamond Lan MD

## 2023-03-20 NOTE — ASSESSMENT & PLAN NOTE
Counseled extensively. Differential reviewed, including serious etiologies. Check echo, event monitor [facility] at their preference. Start metoprolol with precautions as below. Red flag symptoms watch were reviewed, ER if any serious signs or symptoms.

## 2023-03-20 NOTE — ASSESSMENT & PLAN NOTE
Counseled extensively. Differential reviewed, including serious etiologies. Statistics reviewed.   Check thyroid ultrasound if positive they would like referred [facility] or Dr. Rita Upton and may need biopsy

## 2023-03-20 NOTE — ASSESSMENT & PLAN NOTE
counseled, differential reviewed, counseled on primary versus secondary. Risk of reviewed. Willing to have serum catecholamines and renal artery ultrasound checked. Start metoprolol with precautions including bradycardia orthostasis. Parameters on blood pressure and pulse reviewed and written, notify if out of range, ER if dangerous numbers. Low-sodium DASH diet/Mediterranean diet emphasized. Encourage sleep study.

## 2023-03-21 DIAGNOSIS — I16.0 HYPERTENSIVE URGENCY: ICD-10-CM

## 2023-03-21 LAB
EKG ATRIAL RATE: 69 BPM
EKG P AXIS: 25 DEGREES
EKG P-R INTERVAL: 140 MS
EKG Q-T INTERVAL: 376 MS
EKG QRS DURATION: 90 MS
EKG QTC CALCULATION (BAZETT): 402 MS
EKG R AXIS: 27 DEGREES
EKG T AXIS: 26 DEGREES
EKG VENTRICULAR RATE: 69 BPM

## 2023-03-21 PROCEDURE — 93010 ELECTROCARDIOGRAM REPORT: CPT | Performed by: INTERNAL MEDICINE

## 2023-03-21 RX ORDER — EPINEPHRINE 0.3 MG/.3ML
INJECTION SUBCUTANEOUS
Qty: 1 EACH | Refills: 1 | Status: SHIPPED | OUTPATIENT
Start: 2023-03-21

## 2023-03-21 ASSESSMENT — ENCOUNTER SYMPTOMS
ABDOMINAL PAIN: 0
VOMITING: 0
EYE REDNESS: 0
SHORTNESS OF BREATH: 1
NAUSEA: 0

## 2023-03-21 NOTE — ED PROVIDER NOTES
212 lb (96.2 kg)         Constitutional/General: Alert and oriented x3, well appearing, non toxic in NAD  Head: NC/AT  Eyes:  EOMI  Mouth: Oropharynx clear, handling secretions, no trismus  Neck: Supple, full ROM  Pulmonary: Lungs clear to auscultation bilaterally, no wheezes, rales, or rhonchi. Not in respiratory distress  Cardiovascular:  Regular rate and rhythm, no murmurs, gallops, or rubs. 2+ distal pulses  Abdomen: Soft, non tender, non distended,   Extremities: Moves all extremities x 4. Warm and well perfused  Skin: warm and dry without rash  Neurologic: GCS 15, no gross focal neurologic deficits  Psych: Normal Affect      DIAGNOSTIC RESULTS     I have personally reviewed all laboratory and imaging results for this patient. Results are listed below.      LABS:  Results for orders placed or performed during the hospital encounter of 03/19/23   CBC with Auto Differential   Result Value Ref Range    WBC 7.3 4.5 - 11.5 E9/L    RBC 4.87 3.80 - 5.80 E12/L    Hemoglobin 15.1 12.5 - 16.5 g/dL    Hematocrit 42.8 37.0 - 54.0 %    MCV 87.9 80.0 - 99.9 fL    MCH 31.0 26.0 - 35.0 pg    MCHC 35.3 (H) 32.0 - 34.5 %    RDW 12.1 11.5 - 15.0 fL    Platelets 392 185 - 348 E9/L    MPV 9.3 7.0 - 12.0 fL    Neutrophils % 61.7 43.0 - 80.0 %    Immature Granulocytes % 0.4 0.0 - 5.0 %    Lymphocytes % 29.0 20.0 - 42.0 %    Monocytes % 6.9 2.0 - 12.0 %    Eosinophils % 1.6 0.0 - 6.0 %    Basophils % 0.4 0.0 - 2.0 %    Neutrophils Absolute 4.49 1.80 - 7.30 E9/L    Immature Granulocytes # 0.03 E9/L    Lymphocytes Absolute 2.11 1.50 - 4.00 E9/L    Monocytes Absolute 0.50 0.10 - 0.95 E9/L    Eosinophils Absolute 0.12 0.05 - 0.50 E9/L    Basophils Absolute 0.03 0.00 - 0.20 E9/L   CMP   Result Value Ref Range    Sodium 141 132 - 146 mmol/L    Potassium 4.2 3.5 - 5.0 mmol/L    Chloride 103 98 - 107 mmol/L    CO2 28 22 - 29 mmol/L    Anion Gap 10 7 - 16 mmol/L    Glucose 101 (H) 74 - 99 mg/dL    BUN 11 6 - 20 mg/dL    Creatinine 0.8 0.7 - 1.2

## 2023-03-24 ENCOUNTER — OFFICE VISIT (OUTPATIENT)
Dept: PRIMARY CARE CLINIC | Age: 28
End: 2023-03-24
Payer: COMMERCIAL

## 2023-03-24 VITALS
BODY MASS INDEX: 35.89 KG/M2 | TEMPERATURE: 97.8 F | DIASTOLIC BLOOD PRESSURE: 82 MMHG | HEIGHT: 72 IN | OXYGEN SATURATION: 99 % | SYSTOLIC BLOOD PRESSURE: 140 MMHG | HEART RATE: 79 BPM | WEIGHT: 265 LBS

## 2023-03-24 DIAGNOSIS — F41.9 ANXIETY AND DEPRESSION: ICD-10-CM

## 2023-03-24 DIAGNOSIS — R00.2 PALPITATIONS: Primary | ICD-10-CM

## 2023-03-24 DIAGNOSIS — R79.89 ELEVATED LFTS: ICD-10-CM

## 2023-03-24 DIAGNOSIS — E04.1 THYROID NODULE: ICD-10-CM

## 2023-03-24 DIAGNOSIS — G47.33 OSA (OBSTRUCTIVE SLEEP APNEA): ICD-10-CM

## 2023-03-24 DIAGNOSIS — F32.A ANXIETY AND DEPRESSION: ICD-10-CM

## 2023-03-24 DIAGNOSIS — E78.2 MIXED HYPERLIPIDEMIA: ICD-10-CM

## 2023-03-24 DIAGNOSIS — I16.0 HYPERTENSIVE URGENCY: ICD-10-CM

## 2023-03-24 PROCEDURE — G8482 FLU IMMUNIZE ORDER/ADMIN: HCPCS | Performed by: FAMILY MEDICINE

## 2023-03-24 PROCEDURE — 3077F SYST BP >= 140 MM HG: CPT | Performed by: FAMILY MEDICINE

## 2023-03-24 PROCEDURE — 3079F DIAST BP 80-89 MM HG: CPT | Performed by: FAMILY MEDICINE

## 2023-03-24 PROCEDURE — 1036F TOBACCO NON-USER: CPT | Performed by: FAMILY MEDICINE

## 2023-03-24 PROCEDURE — 99214 OFFICE O/P EST MOD 30 MIN: CPT | Performed by: FAMILY MEDICINE

## 2023-03-24 PROCEDURE — G8427 DOCREV CUR MEDS BY ELIG CLIN: HCPCS | Performed by: FAMILY MEDICINE

## 2023-03-24 PROCEDURE — G8417 CALC BMI ABV UP PARAM F/U: HCPCS | Performed by: FAMILY MEDICINE

## 2023-03-24 RX ORDER — METOPROLOL SUCCINATE 50 MG/1
50 TABLET, EXTENDED RELEASE ORAL DAILY
Qty: 30 TABLET | Refills: 1 | Status: SHIPPED | OUTPATIENT
Start: 2023-03-24

## 2023-03-24 NOTE — PROGRESS NOTES
tablet, Rfl: 1    EPINEPHrine (EPIPEN 2-KEREN) 0.3 MG/0.3ML SOAJ injection, Inject thigh as directed as needed anaphylaxis, may repeat if needed, Disp: 1 each, Rfl: 1    sertraline (ZOLOFT) 50 MG tablet, Take 1.5 tablets by mouth daily, Disp: 45 tablet, Rfl: 3    albuterol sulfate  (90 Base) MCG/ACT inhaler, 1-2 puffs q4-6 hrs prn, Disp: 18 g, Rfl: 0    fluticasone (FLONASE) 50 MCG/ACT nasal spray, 2 sprays by Each Nostril route daily ; may reduce to 1 spray each nostril daily maintenance, Disp: 1 Bottle, Rfl: 1    cetirizine (ZYRTEC) 10 MG tablet, Take 1 tablet by mouth daily as needed for Allergies, Disp: 90 tablet, Rfl: 3  Allergies   Allergen Reactions    Bee Venom     Cephalexin Swelling       Past Medical History:   Diagnosis Date    Acne     Attention/concentration deficit, non-ADHD     Depression      Past Surgical History:   Procedure Laterality Date    REL OF TONGUE TIE AND CLOSURE WITH FLAP      TOE SURGERY      TONSILLECTOMY      TONSILLECTOMY AND ADENOIDECTOMY      TYMPANOSTOMY TUBE PLACEMENT      WISDOM TOOTH EXTRACTION       Family History   Problem Relation Age of Onset    Sudden Death Neg Hx      Social History     Tobacco Use    Smoking status: Never    Smokeless tobacco: Never   Substance Use Topics    Alcohol use: No    Drug use: No      Social History     Social History Narrative    PMH:    Health Maintenance:    Influenza Vaccination - (12/7/2015)    Tetanus Immunization - (12/7/2015)    Medical Problems:    Add    Acne - follows with [facility]    depression    Surgical Hx:    T & A, Tympanostomy Tube Insertion, Hyde Tooth Extraction    Reviewed, no changes. FH:    Father:    . (Hx)    Mother:    . (Hx)    Denies CM, sudden death, congenital or otherwise        Reviewed, no changes.     SH:    . (Marital) , baby on the way    Works [employer] for [address] [facility] of [address]: Cigarette Use: Nonsmoker - no smokers at

## 2023-03-24 NOTE — LETTER
March 24, 2023       Shamar Figueroa YOB: 1995   [address] Date of Visit:  3/24/2023       To Whom It May Concern:    Ewa Joseph was seen in my clinic on 3/24/2023. He may return to work on 03/29/2023. If you have any questions or concerns, please don't hesitate to call.     Sincerely,        Lenny Iglesias MD

## 2023-03-27 LAB
CATECHOLS PLAS-IMP: ABNORMAL
DOPAMINE SERPL-MCNC: 45 PG/ML (ref 0–20)
EPINEPH PLAS-MCNC: 20 PG/ML (ref 10–200)
NOREPINEPH PLAS-MCNC: 250 PG/ML (ref 80–520)

## 2023-03-29 ENCOUNTER — OFFICE VISIT (OUTPATIENT)
Dept: PRIMARY CARE CLINIC | Age: 28
End: 2023-03-29
Payer: COMMERCIAL

## 2023-03-29 ENCOUNTER — OFFICE VISIT (OUTPATIENT)
Dept: ENDOCRINOLOGY | Age: 28
End: 2023-03-29
Payer: COMMERCIAL

## 2023-03-29 VITALS
HEART RATE: 78 BPM | WEIGHT: 266 LBS | OXYGEN SATURATION: 98 % | HEIGHT: 72 IN | TEMPERATURE: 97.8 F | SYSTOLIC BLOOD PRESSURE: 142 MMHG | DIASTOLIC BLOOD PRESSURE: 98 MMHG | BODY MASS INDEX: 36.03 KG/M2

## 2023-03-29 VITALS
RESPIRATION RATE: 18 BRPM | HEART RATE: 67 BPM | BODY MASS INDEX: 36.03 KG/M2 | DIASTOLIC BLOOD PRESSURE: 97 MMHG | WEIGHT: 266 LBS | OXYGEN SATURATION: 99 % | SYSTOLIC BLOOD PRESSURE: 147 MMHG | HEIGHT: 72 IN

## 2023-03-29 DIAGNOSIS — I16.0 HYPERTENSIVE URGENCY: Primary | ICD-10-CM

## 2023-03-29 DIAGNOSIS — F41.9 ANXIETY AND DEPRESSION: ICD-10-CM

## 2023-03-29 DIAGNOSIS — I16.0 HYPERTENSIVE URGENCY: ICD-10-CM

## 2023-03-29 DIAGNOSIS — E04.1 THYROID NODULE: Primary | ICD-10-CM

## 2023-03-29 DIAGNOSIS — R79.89 ELEVATED LFTS: ICD-10-CM

## 2023-03-29 DIAGNOSIS — R00.2 PALPITATIONS: ICD-10-CM

## 2023-03-29 DIAGNOSIS — F32.A ANXIETY AND DEPRESSION: ICD-10-CM

## 2023-03-29 DIAGNOSIS — E04.1 THYROID NODULE: ICD-10-CM

## 2023-03-29 DIAGNOSIS — Z00.00 HEALTH MAINTENANCE EXAMINATION: ICD-10-CM

## 2023-03-29 DIAGNOSIS — E78.2 MIXED HYPERLIPIDEMIA: ICD-10-CM

## 2023-03-29 DIAGNOSIS — G47.33 OSA (OBSTRUCTIVE SLEEP APNEA): ICD-10-CM

## 2023-03-29 DIAGNOSIS — R82.5 HIGH CATECHOLAMINES: ICD-10-CM

## 2023-03-29 PROCEDURE — 3080F DIAST BP >= 90 MM HG: CPT | Performed by: FAMILY MEDICINE

## 2023-03-29 PROCEDURE — G8427 DOCREV CUR MEDS BY ELIG CLIN: HCPCS | Performed by: INTERNAL MEDICINE

## 2023-03-29 PROCEDURE — G8482 FLU IMMUNIZE ORDER/ADMIN: HCPCS | Performed by: INTERNAL MEDICINE

## 2023-03-29 PROCEDURE — G8417 CALC BMI ABV UP PARAM F/U: HCPCS | Performed by: INTERNAL MEDICINE

## 2023-03-29 PROCEDURE — G8417 CALC BMI ABV UP PARAM F/U: HCPCS | Performed by: FAMILY MEDICINE

## 2023-03-29 PROCEDURE — 99204 OFFICE O/P NEW MOD 45 MIN: CPT | Performed by: INTERNAL MEDICINE

## 2023-03-29 PROCEDURE — G8427 DOCREV CUR MEDS BY ELIG CLIN: HCPCS | Performed by: FAMILY MEDICINE

## 2023-03-29 PROCEDURE — 3074F SYST BP LT 130 MM HG: CPT | Performed by: INTERNAL MEDICINE

## 2023-03-29 PROCEDURE — 3078F DIAST BP <80 MM HG: CPT | Performed by: INTERNAL MEDICINE

## 2023-03-29 PROCEDURE — 1036F TOBACCO NON-USER: CPT | Performed by: FAMILY MEDICINE

## 2023-03-29 PROCEDURE — 99214 OFFICE O/P EST MOD 30 MIN: CPT | Performed by: FAMILY MEDICINE

## 2023-03-29 PROCEDURE — 1036F TOBACCO NON-USER: CPT | Performed by: INTERNAL MEDICINE

## 2023-03-29 PROCEDURE — 3077F SYST BP >= 140 MM HG: CPT | Performed by: FAMILY MEDICINE

## 2023-03-29 PROCEDURE — G8482 FLU IMMUNIZE ORDER/ADMIN: HCPCS | Performed by: FAMILY MEDICINE

## 2023-03-29 NOTE — LETTER
March 29, 2023       Benny Edmonds YOB: 1995   [address] Date of Visit:  3/29/2023       To Whom It May Concern:    Tatyana Davis was seen in my clinic on 3/29/2023. He may return to work on 04/06/2023. If you have any questions or concerns, please don't hesitate to call.     Sincerely,        Rolan Swartz MD

## 2023-03-29 NOTE — PROGRESS NOTES
liver.  Precautions reviewed. Risks of even fatty liver leading to cirrhosis reviewed. Lifestyle modification and appropriate diet and weight loss reviewed. Other than basic monitoring not interested in in-depth blood work or imaging, other than routine blood work. Weight loss encouraged. Monitor. ASX. Negative hepatitis A/B/C but not immune, he should repeat hepatitis B series and get hepatitis A series but defers at this time. Continues to decline further work-up although basic blood work monitoring      Allergic rhinitis  He is currently on Claritin and would recommend holding if evidence of sinusitis. If no evidence of sinusitis, may resume Claritin versus Allegra versus Zyrtec as directed as needed with standard precautions. Recommended he restart Flonase with standard precautions. He is also had Singulair in the past, risk benefits reviewed including depression        PATO  Suspected, risk of untreated PATO reviewed. Driving precaution reviewed. Avoid supine sleep. Previously, did home sleep study ordered, he did not follow through and does not wish to at this time. Ordered lab study but they would like to switch to home study. Limitations reviewed    No flowsheet data found. Plan as above. Counseled extensively and differential diagnoses relevant to above were reviewed, including serious etiologies, risks and complications, especially of left uncontrolled. If relevant, instructions and  alternatives to meds/treatment reviewed, as well as interactions, and  SE's/ADRs reviewed, notify immediately if any, discontinuing new meds if any. Plan made after discussion and shared decision making. Overall feeling much better. He would like to return to work light duty with a follow-up in 1 week sooner as needed and possibly lift further restrictions then. Await [facility] [employer] [facility] clinic cardiology but not till next month. Continue per Dr Brittany Wan. Await 24-hour urine.   Await renal artery ultrasound,

## 2023-03-29 NOTE — PROGRESS NOTES
[address] [place] [address] Department of Endocrinology Diabetes and Metabolism   [address] [facility], [address] [facility] [address] [address]   Phone: [phone]  Fax: [phone]    Date of Service: 3/29/2023    Primary Care Physician: Jack Hendricks MD  Referring physician: Isabel Merrill MD  Provider: Omkar Bashir MD            Reason for the visit:  Multinodular goiter     History of Present Illness: The history is provided by the patient. No  was used. Accuracy of the patient data is excellent. Alexandra Shaver is a very pleasant 24 y.o. male seen today for evaluation and management of multinodular goiter     The patient was found to have thyroid nodule on CT cervical spine that was done for evaluation of neck pain     3/27/2023 Dedicated thyroid US  In the Rt thyroid lobe there is a 1.1 x 0.8 x 1.0 cm solid hypoechoic nodule in the right thyroid lobe with smooth margins and no shadowing calcifications. The lesion is TR 4    Alexandra Shaver denies any  voice change, or shortness of breath. No family history of thyroid cancer. No prior history of radiation to head or neck region. Plasma fractionated catecholamines --> negative apart from mildly elevated dopamine     Lab Results   Component Value Date/Time    TSH 1.620 03/19/2023 11:10 AM    F1DSDBH 6.2 03/19/2023 11:10 AM     The patient also has been struggling with high BP   Currently on Metoprolol 50 mg daily. Because of his age, PCP ordered workup to r/o secondary hypertension.  Renal US was negative fore any MAHOGANY   Plasma catecholamines was negative apart form mildly elevated dopamine   CATECHOLAMINES, FRACTIONATED, PLASMA       Component Ref Range & Units 3/21/23 1658   Dopamine 0 - 20 pg/mL 45 High     Epinephrine 10 - 200 pg/mL 20    Norepinephrine 80 - 520 pg/mL 250         PAST MEDICAL HISTORY   Past Medical History:   Diagnosis Date    Acne     Attention/concentration deficit, non-ADHD     Depression        PAST SURGICAL

## 2023-04-04 ENCOUNTER — HOSPITAL ENCOUNTER (OUTPATIENT)
Dept: ULTRASOUND IMAGING | Age: 28
Discharge: HOME | End: 2023-04-06
Payer: COMMERCIAL

## 2023-04-04 DIAGNOSIS — I16.0 HYPERTENSIVE URGENCY: ICD-10-CM

## 2023-04-04 LAB
CATECHOLS UR-IMP: ABNORMAL
COLLECT DURATION TIME SPEC: 24 H
CREAT 24H UR-MCNC: 125 MG/DL
CREAT 24H UR-MRATE: 2625 MG/D (ref 1000–2500)
DOPAMINE 24H UR-MRATE: 357 UG/D (ref 71–485)
DOPAMINE UR-MCNC: 170 UG/L
DOPAMINE/CREAT UR: 136 UG/G CRT (ref 0–250)
EPINEPH 24H UR-MRATE: 4 UG/D (ref 1–14)
EPINEPH UR-MCNC: 2 UG/L
EPINEPH/CREAT UR: 2 UG/G CRT (ref 0–20)
METANEPH 24H UR-MCNC: 42 UG/L
METANEPH 24H UR-MRATE: 88 UG/D (ref 55–320)
METANEPH+NORMETANEPH UR-IMP: NORMAL
METANEPHRINE UG/G CRE: 34 UG/G CRT (ref 0–300)
NOREPINEPH 24H UR-MRATE: 67 UG/D (ref 14–120)
NOREPINEPH UR-MCNC: 32 UG/L
NOREPINEPH/CREAT UR: 26 UG/G CRT (ref 0–45)
NORMETANEPHRINE 24H UR-MCNC: 163 UG/L
NORMETANEPHRINE 24H UR-MRATE: 342 UG/D (ref 81–667)
NORMETANEPHRINE, (G/CRT): 130 UG/G CRT (ref 0–400)
SPECIMEN VOL ?TM UR: 2100 ML

## 2023-04-04 PROCEDURE — 76770 US EXAM ABDO BACK WALL COMP: CPT

## 2023-04-04 PROCEDURE — 93975 VASCULAR STUDY: CPT

## 2023-04-05 ENCOUNTER — OFFICE VISIT (OUTPATIENT)
Dept: PRIMARY CARE CLINIC | Age: 28
End: 2023-04-05
Payer: COMMERCIAL

## 2023-04-05 VITALS
OXYGEN SATURATION: 97 % | HEART RATE: 104 BPM | DIASTOLIC BLOOD PRESSURE: 98 MMHG | WEIGHT: 266 LBS | HEIGHT: 72 IN | BODY MASS INDEX: 36.03 KG/M2 | TEMPERATURE: 98.3 F | SYSTOLIC BLOOD PRESSURE: 148 MMHG

## 2023-04-05 DIAGNOSIS — F32.A ANXIETY AND DEPRESSION: ICD-10-CM

## 2023-04-05 DIAGNOSIS — I70.1 RENAL ARTERY STENOSIS (HCC): ICD-10-CM

## 2023-04-05 DIAGNOSIS — I16.0 HYPERTENSIVE URGENCY: Primary | ICD-10-CM

## 2023-04-05 DIAGNOSIS — F41.9 ANXIETY AND DEPRESSION: ICD-10-CM

## 2023-04-05 DIAGNOSIS — R00.2 PALPITATIONS: ICD-10-CM

## 2023-04-05 PROCEDURE — 99215 OFFICE O/P EST HI 40 MIN: CPT | Performed by: FAMILY MEDICINE

## 2023-04-05 PROCEDURE — G8417 CALC BMI ABV UP PARAM F/U: HCPCS | Performed by: FAMILY MEDICINE

## 2023-04-05 PROCEDURE — 1036F TOBACCO NON-USER: CPT | Performed by: FAMILY MEDICINE

## 2023-04-05 PROCEDURE — 3077F SYST BP >= 140 MM HG: CPT | Performed by: FAMILY MEDICINE

## 2023-04-05 PROCEDURE — G8427 DOCREV CUR MEDS BY ELIG CLIN: HCPCS | Performed by: FAMILY MEDICINE

## 2023-04-05 PROCEDURE — 3080F DIAST BP >= 90 MM HG: CPT | Performed by: FAMILY MEDICINE

## 2023-04-05 RX ORDER — METOPROLOL SUCCINATE 50 MG/1
TABLET, EXTENDED RELEASE ORAL
Qty: 45 TABLET | Refills: 1 | Status: SHIPPED | OUTPATIENT
Start: 2023-04-05

## 2023-04-05 NOTE — LETTER
April 5, 2023       Alanna Tran YOB: 1995   [address] Date of Visit:  4/5/2023       To Whom It May Concern:    Kennedy Rios was seen in my clinic on 4/5/2023. He may return to work Monday 4/10/23 light duty, 8 hr/day 40 hr/week, avoid lifting > 25lbs until follow up. If you have any questions or concerns, please don't hesitate to call.     Sincerely,        Kiara Baker MD

## 2023-04-05 NOTE — PROGRESS NOTES
sweats. Eyes: Denies discharge, pain, redness and visual disturbance. ENMT: Denies earaches, other ear symptoms. Denies nasal or sinus symptoms other than stated  above. Denies mouth and tongue lesions and sore throat. CV: Denies chest discomfort, pain; diaphoresis, dizziness, edema, lightheadedness, orthopnea,  palpitations, syncope and near syncopal episode or any exertional symptoms  Resp: Denies cough, hemoptysis, pleuritic pain, SOB, sputum production and wheezing. GI: Denies abdominal pain, change in bowel habits, hematochezia, melena, nausea and vomiting. : Denies urinary symptoms including dysuria , urgency, frequency or hematuria. Musculo: Denies musculoskeletal symptoms. Skin: Denies bruising and rash.   Neuro: Denies headache, numbness, stiff neck, tingling and focal weakness slurred speech or facial  droop  Hema/Lymph: Denies bleeding/bruising tendency and enlarged lymph nodes        Current Outpatient Medications:     metoprolol succinate (TOPROL XL) 50 MG extended release tablet, 1/2 po QAM; 1 po QPM, Disp: 45 tablet, Rfl: 1    EPINEPHrine (EPIPEN 2-KEREN) 0.3 MG/0.3ML SOAJ injection, Inject thigh as directed as needed anaphylaxis, may repeat if needed, Disp: 1 each, Rfl: 1    sertraline (ZOLOFT) 50 MG tablet, Take 1.5 tablets by mouth daily, Disp: 45 tablet, Rfl: 3    albuterol sulfate  (90 Base) MCG/ACT inhaler, 1-2 puffs q4-6 hrs prn, Disp: 18 g, Rfl: 0    fluticasone (FLONASE) 50 MCG/ACT nasal spray, 2 sprays by Each Nostril route daily ; may reduce to 1 spray each nostril daily maintenance, Disp: 1 Bottle, Rfl: 1    cetirizine (ZYRTEC) 10 MG tablet, Take 1 tablet by mouth daily as needed for Allergies, Disp: 90 tablet, Rfl: 3  Allergies   Allergen Reactions    Bee Venom     Cephalexin Swelling       Past Medical History:   Diagnosis Date    Acne     Attention/concentration deficit, non-ADHD     Depression      Past Surgical History:   Procedure Laterality Date    REL OF TONGUE TIE AND

## 2023-04-07 ENCOUNTER — HOSPITAL ENCOUNTER (OUTPATIENT)
Dept: CARDIOLOGY | Age: 28
Discharge: HOME | End: 2023-04-07
Payer: COMMERCIAL

## 2023-04-07 ENCOUNTER — TELEPHONE (OUTPATIENT)
Dept: PRIMARY CARE CLINIC | Age: 28
End: 2023-04-07

## 2023-04-07 DIAGNOSIS — I16.0 HYPERTENSIVE URGENCY: ICD-10-CM

## 2023-04-07 DIAGNOSIS — R00.2 PALPITATIONS: ICD-10-CM

## 2023-04-07 LAB
LV EF: 58 %
LVEF MODALITY: NORMAL

## 2023-04-07 PROCEDURE — 93306 TTE W/DOPPLER COMPLETE: CPT

## 2023-04-19 ENCOUNTER — APPOINTMENT (OUTPATIENT)
Dept: GENERAL RADIOLOGY | Age: 28
End: 2023-04-19
Payer: COMMERCIAL

## 2023-04-19 ENCOUNTER — APPOINTMENT (OUTPATIENT)
Dept: CT IMAGING | Age: 28
End: 2023-04-19
Payer: COMMERCIAL

## 2023-04-19 ENCOUNTER — HOSPITAL ENCOUNTER (EMERGENCY)
Age: 28
Discharge: HOME | End: 2023-04-19
Attending: EMERGENCY MEDICINE
Payer: COMMERCIAL

## 2023-04-19 VITALS
BODY MASS INDEX: 27.9 KG/M2 | OXYGEN SATURATION: 100 % | HEIGHT: 72 IN | DIASTOLIC BLOOD PRESSURE: 94 MMHG | RESPIRATION RATE: 16 BRPM | TEMPERATURE: 97 F | WEIGHT: 206 LBS | HEART RATE: 81 BPM | SYSTOLIC BLOOD PRESSURE: 155 MMHG

## 2023-04-19 DIAGNOSIS — I16.0 HYPERTENSIVE URGENCY: Primary | ICD-10-CM

## 2023-04-19 LAB
ALBUMIN SERPL-MCNC: 4.7 G/DL (ref 3.5–5.2)
ALP SERPL-CCNC: 63 U/L (ref 40–129)
ALT SERPL-CCNC: 76 U/L (ref 0–40)
ANION GAP SERPL CALCULATED.3IONS-SCNC: 12 MMOL/L (ref 7–16)
AST SERPL-CCNC: 41 U/L (ref 0–39)
BASOPHILS # BLD: 0.05 E9/L (ref 0–0.2)
BASOPHILS NFR BLD: 0.5 % (ref 0–2)
BILIRUB SERPL-MCNC: 0.5 MG/DL (ref 0–1.2)
BUN SERPL-MCNC: 12 MG/DL (ref 6–20)
CALCIUM SERPL-MCNC: 9.6 MG/DL (ref 8.6–10.2)
CHLORIDE SERPL-SCNC: 100 MMOL/L (ref 98–107)
CO2 SERPL-SCNC: 27 MMOL/L (ref 22–29)
CREAT SERPL-MCNC: 0.9 MG/DL (ref 0.7–1.2)
EKG ATRIAL RATE: 74 BPM
EKG P AXIS: 13 DEGREES
EKG P-R INTERVAL: 158 MS
EKG Q-T INTERVAL: 372 MS
EKG QRS DURATION: 80 MS
EKG QTC CALCULATION (BAZETT): 412 MS
EKG R AXIS: 7 DEGREES
EKG T AXIS: 1 DEGREES
EKG VENTRICULAR RATE: 74 BPM
EOSINOPHIL # BLD: 0.19 E9/L (ref 0.05–0.5)
EOSINOPHIL NFR BLD: 2 % (ref 0–6)
ERYTHROCYTE [DISTWIDTH] IN BLOOD BY AUTOMATED COUNT: 12.1 FL (ref 11.5–15)
GLUCOSE SERPL-MCNC: 83 MG/DL (ref 74–99)
HCT VFR BLD AUTO: 44.6 % (ref 37–54)
HGB BLD-MCNC: 15.5 G/DL (ref 12.5–16.5)
IMM GRANULOCYTES # BLD: 0.03 E9/L
IMM GRANULOCYTES NFR BLD: 0.3 % (ref 0–5)
LYMPHOCYTES # BLD: 3.22 E9/L (ref 1.5–4)
LYMPHOCYTES NFR BLD: 33.8 % (ref 20–42)
MCH RBC QN AUTO: 30.2 PG (ref 26–35)
MCHC RBC AUTO-ENTMCNC: 34.8 % (ref 32–34.5)
MCV RBC AUTO: 86.9 FL (ref 80–99.9)
MONOCYTES # BLD: 0.72 E9/L (ref 0.1–0.95)
MONOCYTES NFR BLD: 7.6 % (ref 2–12)
NEUTROPHILS # BLD: 5.32 E9/L (ref 1.8–7.3)
NEUTS SEG NFR BLD: 55.8 % (ref 43–80)
PLATELET # BLD AUTO: 272 E9/L (ref 130–450)
PMV BLD AUTO: 9.3 FL (ref 7–12)
POTASSIUM SERPL-SCNC: 4 MMOL/L (ref 3.5–5)
PROT SERPL-MCNC: 8 G/DL (ref 6.4–8.3)
RBC # BLD AUTO: 5.13 E12/L (ref 3.8–5.8)
SODIUM SERPL-SCNC: 139 MMOL/L (ref 132–146)
TROPONIN, HIGH SENSITIVITY: <6 NG/L (ref 0–11)
WBC # BLD: 9.5 E9/L (ref 4.5–11.5)

## 2023-04-19 PROCEDURE — 71046 X-RAY EXAM CHEST 2 VIEWS: CPT

## 2023-04-19 PROCEDURE — 70450 CT HEAD/BRAIN W/O DYE: CPT

## 2023-04-19 PROCEDURE — 93005 ELECTROCARDIOGRAM TRACING: CPT | Performed by: NURSE PRACTITIONER

## 2023-04-19 PROCEDURE — 84484 ASSAY OF TROPONIN QUANT: CPT

## 2023-04-19 PROCEDURE — 93010 ELECTROCARDIOGRAM REPORT: CPT | Performed by: INTERNAL MEDICINE

## 2023-04-19 PROCEDURE — 99285 EMERGENCY DEPT VISIT HI MDM: CPT

## 2023-04-19 PROCEDURE — 80053 COMPREHEN METABOLIC PANEL: CPT

## 2023-04-19 PROCEDURE — 85025 COMPLETE CBC W/AUTO DIFF WBC: CPT

## 2023-04-19 ASSESSMENT — PAIN - FUNCTIONAL ASSESSMENT
PAIN_FUNCTIONAL_ASSESSMENT: NONE - DENIES PAIN
PAIN_FUNCTIONAL_ASSESSMENT: NONE - DENIES PAIN

## 2023-04-19 NOTE — ED PROVIDER NOTES
reviewed by myself. BP (!) 152/84   Pulse 76   Temp 97 °F (36.1 °C)   Resp 16   Ht 6' (1.829 m)   Wt 206 lb (93.4 kg)   SpO2 100%   BMI 27.94 kg/m²   Oxygen Saturation Interpretation: Normal    The patients available past medical records and past encounters were reviewed. ------------------------------ ED COURSE/MEDICAL DECISION MAKING----------------------    Independent interpretation of tests:  CXR - no focal consolidation, pneumothorax, or pleural effusion     Counseling: The emergency provider has spoken with the patient and discussed todays results, in addition to providing specific details for the plan of care and counseling regarding the diagnosis and prognosis. Questions are answered at this time and they are agreeable with the plan. ED Course/Medical Decision Makin y.o. male here with hypertension. Asymptomatic on arrival.  Well-appearing, hemodynamically stable, and in no acute distress. Breathing comfortably on room air without respiratory distress. Neurovascularly intact throughout. EKG and troponin not c/w ACS. No evidence of end organ damage to suggest hypertensive emergency. Patient is only on metoprolol for HTN, recommended that he discuss other medication options with his PCP. After discussion of findings and return precautions, patient agrees with plan for discharge and outpatient follow up with PCP.       --------------------------------- IMPRESSION AND DISPOSITION ---------------------------------    IMPRESSION  1. Hypertensive urgency        DISPOSITION  Disposition: Discharge to home  Patient condition is good    NOTE: This report was transcribed using voice recognition software.  Every effort was made to ensure accuracy; however, inadvertent computerized transcription errors may be present    Tomas Villareal MD  Attending Emergency Physician         Tomas Villareal MD  [phone]

## 2023-04-19 NOTE — ED NOTES
Department of Emergency Medicine  FIRST PROVIDER TRIAGE NOTE             Independent MLP           4/19/23  3:13 PM EDT    Date of Encounter: 4/19/23   MRN: [id number]      HPI: Trena Larios is a 24 y.o. male who presents to the ED for Hypertension and Dizziness   Reports BP was 145/93 HR 73, had a headache when he woke up that improved when he was up and moving. Was recently started on Metoprolol 25mg in the morning and 50mg in the evening. Headache is completely resolved but is having persistent dizziness. ROS: Negative for cp or sob. PE: Gen Appearance/Constitutional: alert  HEENT: NC/NT. PERRLA,  Airway patent. Neck: supple  CV: regular rate  Pulm: CTA bilat  GI: soft and NT  Musculoskeletal: moves all extremities x 4  Lymphatics: no edema     Initial Plan of Care: All treatment areas with department are currently occupied. Plan to order/Initiate the following while awaiting opening in ED: labs, EKG, and imaging studies.   Initiate Treatment-Testing, Proceed toTreatment Area When Bed Available for ED Attending/MLP to Continue Care    Electronically signed by BELGICA Yoo CNP   DD: 4/19/23      BELGICA Yoo CNP  04/19/23 2508

## 2023-04-25 ENCOUNTER — OFFICE VISIT (OUTPATIENT)
Dept: PRIMARY CARE CLINIC | Age: 28
End: 2023-04-25
Payer: COMMERCIAL

## 2023-04-25 VITALS
DIASTOLIC BLOOD PRESSURE: 98 MMHG | OXYGEN SATURATION: 98 % | TEMPERATURE: 98.5 F | BODY MASS INDEX: 37.11 KG/M2 | HEIGHT: 72 IN | HEART RATE: 93 BPM | WEIGHT: 274 LBS | SYSTOLIC BLOOD PRESSURE: 140 MMHG

## 2023-04-25 DIAGNOSIS — F32.A ANXIETY AND DEPRESSION: ICD-10-CM

## 2023-04-25 DIAGNOSIS — I16.0 HYPERTENSIVE URGENCY: ICD-10-CM

## 2023-04-25 DIAGNOSIS — I70.1 RENAL ARTERY STENOSIS (HCC): Primary | ICD-10-CM

## 2023-04-25 DIAGNOSIS — F41.9 ANXIETY AND DEPRESSION: ICD-10-CM

## 2023-04-25 DIAGNOSIS — G47.33 OSA (OBSTRUCTIVE SLEEP APNEA): ICD-10-CM

## 2023-04-25 DIAGNOSIS — R00.2 PALPITATIONS: ICD-10-CM

## 2023-04-25 DIAGNOSIS — R79.89 ELEVATED LFTS: ICD-10-CM

## 2023-04-25 DIAGNOSIS — E78.2 MIXED HYPERLIPIDEMIA: ICD-10-CM

## 2023-04-25 DIAGNOSIS — E04.1 THYROID NODULE: ICD-10-CM

## 2023-04-25 PROBLEM — J40 BRONCHITIS: Status: RESOLVED | Noted: 2021-11-16 | Resolved: 2023-04-25

## 2023-04-25 PROBLEM — R21 RASH: Status: RESOLVED | Noted: 2021-01-25 | Resolved: 2023-04-25

## 2023-04-25 PROCEDURE — 3080F DIAST BP >= 90 MM HG: CPT | Performed by: FAMILY MEDICINE

## 2023-04-25 PROCEDURE — 3077F SYST BP >= 140 MM HG: CPT | Performed by: FAMILY MEDICINE

## 2023-04-25 PROCEDURE — G8427 DOCREV CUR MEDS BY ELIG CLIN: HCPCS | Performed by: FAMILY MEDICINE

## 2023-04-25 PROCEDURE — 1036F TOBACCO NON-USER: CPT | Performed by: FAMILY MEDICINE

## 2023-04-25 PROCEDURE — 99215 OFFICE O/P EST HI 40 MIN: CPT | Performed by: FAMILY MEDICINE

## 2023-04-25 PROCEDURE — G8417 CALC BMI ABV UP PARAM F/U: HCPCS | Performed by: FAMILY MEDICINE

## 2023-04-25 RX ORDER — AMLODIPINE BESYLATE 5 MG/1
5 TABLET ORAL DAILY
Qty: 30 TABLET | Refills: 1 | Status: SHIPPED | OUTPATIENT
Start: 2023-04-25

## 2023-04-25 RX ORDER — METOPROLOL SUCCINATE 50 MG/1
50 TABLET, EXTENDED RELEASE ORAL EVERY EVENING
Qty: 30 TABLET | Refills: 1 | Status: SHIPPED | OUTPATIENT
Start: 2023-04-25

## 2023-04-25 NOTE — PROGRESS NOTES
complications, especially of left uncontrolled. If relevant, instructions and  alternatives to meds/treatment reviewed, as well as interactions, and  SE's/ADRs reviewed, notify immediately if any, discontinuing new meds if any. Plan made after discussion and shared decision making. Continue light duty. See specialist ASAP, has cardiology clean clinic appointment that he rescheduled and now May 26, he was unable to keep his [facility] [employer] nephrology appointment and he will reschedule ASAP, clinically vascular recommend he see cardiologist first.  Has been monitored on able tomorrow. Await these results. Higher dose metoprolol causing fatigue but he did tolerate 50 g of even to continue 50 mg q. evening and add amlodipine 5 mg every morning. Check CMP follow-up 2 weeks sooner as needed. Precaution reviewed. Also continue Dr. Danisha Coy. Again role of hospitalization reviewed to expedite process. Red flag symptoms to watch for are reviewed. He would consider if symptoms persist worsen or other serious signs or symptoms. As long as symptoms steadily improve/resolve, and medical conditions follow the expected course, FU as below, sooner PRN. Return in about 2 weeks (around 5/9/2023), or if symptoms worsen or fail to improve, for w/ cmp. Educational materials and/or home exercises printed for patient's review and were included in patient instructions on his/her After Visit Summary and given to patient at the end of visit. Follow-up this week, he prefers Thursday or Friday sooner as needed. After discussion, patient and/or guardian verbalizes understanding, agrees, feels comfortable with and wishes to proceed with above treatment plan. Call for any pending results, FU sooner if abnormal, as needed or if any current symptoms persist/worsen.       Advised patient to call with any new medication issues, and read all Rx info from pharmacy to assure aware of all possible risks and side effects

## 2023-05-02 NOTE — TELEPHONE ENCOUNTER
3 weeks later no response   Opened by: Joni Rausch MD                  on Fri Apr 7, 2023 11:52 AM         Doned by: Joni Rausch MD                  on Fri Apr 7, 2023 11:52 AM

## 2023-05-09 ENCOUNTER — OFFICE VISIT (OUTPATIENT)
Dept: PRIMARY CARE CLINIC | Age: 28
End: 2023-05-09
Payer: COMMERCIAL

## 2023-05-09 VITALS
HEART RATE: 86 BPM | BODY MASS INDEX: 36.48 KG/M2 | DIASTOLIC BLOOD PRESSURE: 88 MMHG | SYSTOLIC BLOOD PRESSURE: 138 MMHG | OXYGEN SATURATION: 95 % | TEMPERATURE: 97.6 F | WEIGHT: 269 LBS

## 2023-05-09 DIAGNOSIS — Z00.00 HEALTH MAINTENANCE EXAMINATION: ICD-10-CM

## 2023-05-09 DIAGNOSIS — F32.A ANXIETY AND DEPRESSION: ICD-10-CM

## 2023-05-09 DIAGNOSIS — F41.9 ANXIETY AND DEPRESSION: ICD-10-CM

## 2023-05-09 DIAGNOSIS — G47.33 OSA (OBSTRUCTIVE SLEEP APNEA): ICD-10-CM

## 2023-05-09 DIAGNOSIS — E78.2 MIXED HYPERLIPIDEMIA: ICD-10-CM

## 2023-05-09 DIAGNOSIS — R79.89 ELEVATED LFTS: ICD-10-CM

## 2023-05-09 DIAGNOSIS — I70.1 RENAL ARTERY STENOSIS (HCC): Primary | ICD-10-CM

## 2023-05-09 DIAGNOSIS — E04.1 THYROID NODULE: ICD-10-CM

## 2023-05-09 DIAGNOSIS — R00.2 PALPITATIONS: ICD-10-CM

## 2023-05-09 DIAGNOSIS — I10 ESSENTIAL HYPERTENSION: ICD-10-CM

## 2023-05-09 PROCEDURE — G8417 CALC BMI ABV UP PARAM F/U: HCPCS | Performed by: FAMILY MEDICINE

## 2023-05-09 PROCEDURE — 99214 OFFICE O/P EST MOD 30 MIN: CPT | Performed by: FAMILY MEDICINE

## 2023-05-09 PROCEDURE — 3079F DIAST BP 80-89 MM HG: CPT | Performed by: FAMILY MEDICINE

## 2023-05-09 PROCEDURE — G8427 DOCREV CUR MEDS BY ELIG CLIN: HCPCS | Performed by: FAMILY MEDICINE

## 2023-05-09 PROCEDURE — 3075F SYST BP GE 130 - 139MM HG: CPT | Performed by: FAMILY MEDICINE

## 2023-05-09 PROCEDURE — 1036F TOBACCO NON-USER: CPT | Performed by: FAMILY MEDICINE

## 2023-05-18 DIAGNOSIS — R00.2 PALPITATIONS: ICD-10-CM

## 2023-07-10 ENCOUNTER — TELEPHONE (OUTPATIENT)
Dept: SLEEP CENTER | Age: 28
End: 2023-07-10

## 2023-07-10 DIAGNOSIS — G47.33 OSA (OBSTRUCTIVE SLEEP APNEA): Primary | ICD-10-CM

## 2023-07-11 ENCOUNTER — TELEPHONE (OUTPATIENT)
Dept: SLEEP MEDICINE | Age: 28
End: 2023-07-11

## 2023-07-11 NOTE — TELEPHONE ENCOUNTER
Pts wife Quinn ret call. Discussed sleep study results with her because pt is at work. Explained that pt does have moderate PATO that worsens when he is on his back.  does recommend starting CPAP therapy. She stated she would like to discuss this with pt and her Mom then call back with what pt decides to do.

## 2023-07-15 ENCOUNTER — HOSPITAL ENCOUNTER (OUTPATIENT)
Age: 28
Discharge: HOME | End: 2023-07-15
Payer: COMMERCIAL

## 2023-07-15 DIAGNOSIS — E78.2 MIXED HYPERLIPIDEMIA: ICD-10-CM

## 2023-07-15 LAB
ALBUMIN SERPL-MCNC: 4.5 G/DL (ref 3.5–5.2)
ALP SERPL-CCNC: 70 U/L (ref 40–129)
ALT SERPL-CCNC: 50 U/L (ref 0–40)
ANION GAP SERPL CALCULATED.3IONS-SCNC: 9 MMOL/L (ref 7–16)
AST SERPL-CCNC: 24 U/L (ref 0–39)
BILIRUB SERPL-MCNC: 0.8 MG/DL (ref 0–1.2)
BUN SERPL-MCNC: 13 MG/DL (ref 6–20)
CALCIUM SERPL-MCNC: 9.6 MG/DL (ref 8.6–10.2)
CHLORIDE SERPL-SCNC: 103 MMOL/L (ref 98–107)
CHOLEST SERPL-MCNC: 219 MG/DL
CK SERPL-CCNC: 85 U/L (ref 20–200)
CO2 SERPL-SCNC: 27 MMOL/L (ref 22–29)
CREAT SERPL-MCNC: 0.9 MG/DL (ref 0.7–1.2)
GFR SERPL CREATININE-BSD FRML MDRD: >60 ML/MIN/1.73M2
GLUCOSE SERPL-MCNC: 92 MG/DL (ref 74–99)
HDLC SERPL-MCNC: 43 MG/DL
LDLC SERPL CALC-MCNC: 138 MG/DL
POTASSIUM SERPL-SCNC: 4.3 MMOL/L (ref 3.5–5)
PROT SERPL-MCNC: 7.8 G/DL (ref 6.4–8.3)
SODIUM SERPL-SCNC: 139 MMOL/L (ref 132–146)
TRIGL SERPL-MCNC: 192 MG/DL
VLDLC SERPL CALC-MCNC: 38 MG/DL

## 2023-07-15 PROCEDURE — 80053 COMPREHEN METABOLIC PANEL: CPT

## 2023-07-15 PROCEDURE — 36415 COLL VENOUS BLD VENIPUNCTURE: CPT

## 2023-07-15 PROCEDURE — 82550 ASSAY OF CK (CPK): CPT

## 2023-07-15 PROCEDURE — 80061 LIPID PANEL: CPT

## 2023-07-17 ENCOUNTER — OFFICE VISIT (OUTPATIENT)
Dept: PRIMARY CARE CLINIC | Age: 28
End: 2023-07-17
Payer: COMMERCIAL

## 2023-07-17 VITALS
BODY MASS INDEX: 35.13 KG/M2 | TEMPERATURE: 97.8 F | OXYGEN SATURATION: 98 % | HEART RATE: 89 BPM | SYSTOLIC BLOOD PRESSURE: 136 MMHG | WEIGHT: 259 LBS | DIASTOLIC BLOOD PRESSURE: 88 MMHG

## 2023-07-17 DIAGNOSIS — I70.1 RENAL ARTERY STENOSIS (HCC): ICD-10-CM

## 2023-07-17 DIAGNOSIS — E04.1 THYROID NODULE: ICD-10-CM

## 2023-07-17 DIAGNOSIS — I10 ESSENTIAL HYPERTENSION: ICD-10-CM

## 2023-07-17 DIAGNOSIS — G47.33 OSA (OBSTRUCTIVE SLEEP APNEA): ICD-10-CM

## 2023-07-17 DIAGNOSIS — E78.2 MIXED HYPERLIPIDEMIA: Primary | ICD-10-CM

## 2023-07-17 DIAGNOSIS — R79.89 ELEVATED LFTS: ICD-10-CM

## 2023-07-17 DIAGNOSIS — F32.A ANXIETY AND DEPRESSION: ICD-10-CM

## 2023-07-17 DIAGNOSIS — F41.9 ANXIETY AND DEPRESSION: ICD-10-CM

## 2023-07-17 PROCEDURE — G8427 DOCREV CUR MEDS BY ELIG CLIN: HCPCS | Performed by: FAMILY MEDICINE

## 2023-07-17 PROCEDURE — G8417 CALC BMI ABV UP PARAM F/U: HCPCS | Performed by: FAMILY MEDICINE

## 2023-07-17 PROCEDURE — 3075F SYST BP GE 130 - 139MM HG: CPT | Performed by: FAMILY MEDICINE

## 2023-07-17 PROCEDURE — 3079F DIAST BP 80-89 MM HG: CPT | Performed by: FAMILY MEDICINE

## 2023-07-17 PROCEDURE — 99214 OFFICE O/P EST MOD 30 MIN: CPT | Performed by: FAMILY MEDICINE

## 2023-07-17 PROCEDURE — 1036F TOBACCO NON-USER: CPT | Performed by: FAMILY MEDICINE

## 2023-07-17 RX ORDER — PRAVASTATIN SODIUM 20 MG
20 TABLET ORAL NIGHTLY
Qty: 30 TABLET | Refills: 3 | Status: SHIPPED | OUTPATIENT
Start: 2023-07-17

## 2023-07-17 RX ORDER — LISINOPRIL 10 MG/1
10 TABLET ORAL DAILY
COMMUNITY
Start: 2023-07-07

## 2023-07-17 NOTE — PROGRESS NOTES
To whom it may concern: This is an appeal letter in regards to VARINDER Mendoza 1995. Patient states his sleep study was denied because it was not considered \"medically necessary\". Patient has been having issues with hypertensive urgency, palpitations and a myriad of other symptoms. He had complained of severe snoring and apneic spells. Multiple specialists at [facility], as well as I, were very suspicious that obstructive sleep apnea was contributing to his medical conditions. His sleep study was in fact positive for sleep apnea with an AHI as high as 68.3/hour during stage R sleep, averaging 24.3 events/hour. He also had an oxygen saturation that dropped as low as 83%. We asked that you reconsider your approval of this important study for this patient. If you have questions please feel free to contact me. Sincerely,        Lb James MD

## 2023-07-17 NOTE — PROGRESS NOTES
Colleen Gutierrez : 1995 Sex: male  Age: 24 y.o. Chief Complaint   Patient presents with    Discuss Labs    Hypertension       HPI  HPI:    Presents today with wife. Sleep study got denied as \"not medically necessary\" although sleep study was positive with an overall AHI 24.3, stage R sleep 68.3, minimum oxygen saturation down to 83%. It was recommended that he start auto CPAP. Unfortunately insurance denied study so they were nervous about moving forward with a CPAP which is an important component to his treatment. Continued weight loss and avoidance of supine sleep and also reviewed. Goal is to have an AHI less than or equal to 5. He should follow-up with sleep specialist at least by 3 months. He states he did not hear anything about the tilt table ultrasound or GI referral from [facility]. I did encourage them to call [facility] however he is feeling much better overall      He has seen multiple specialist [facility]. He states he basically redid the testing we did. Vascular specialist impression/plan    \"IMPRESSION:  Mr. Benitez Mt is a 24year old male .    Hypertension appears to have uncontrolled despite 2 medications renal duplex is suggestive of fibromuscular dysplasia  Chest pain may be related to elevated blood pressure however has risk factors of high BMI and hypertension  Elevated BMI  Symptoms of dizziness fatigue palpitations rapid heart rate and near syncopal episode need to rule out orthostatic hypotension or venous insufficiency  PLAN AND RECOMMENDATIONS:  Support stockings  Referred to syncope center may require tilt table test  Home blood pressure monitoring  Renal artery duplex [facility]  We will discuss with Dr. Amelie Cottrell may require balloon angioplasty if fibromuscular dysplasia is confirmed to control blood pressure  Zio patch to evaluate for arrhythmia  Echocardiography due to history of left ventricular hypertrophy  Referred to GI for fatty liver and

## 2023-07-25 ENCOUNTER — TELEPHONE (OUTPATIENT)
Dept: SLEEP MEDICINE | Age: 28
End: 2023-07-25

## 2023-07-25 NOTE — TELEPHONE ENCOUNTER
Pt wife called in stating that pt would like to start CPAP therapy as discussed earlier this month. Pt does not have preference for DME co.  Will send all necessary information to [facility]. Pts wife is agreeable. Reminded of compliance check as per insurance.   Appt made for 10/26

## 2023-08-17 DIAGNOSIS — I16.0 HYPERTENSIVE URGENCY: ICD-10-CM

## 2023-08-17 DIAGNOSIS — I70.1 RENAL ARTERY STENOSIS (HCC): ICD-10-CM

## 2023-08-17 DIAGNOSIS — R00.2 PALPITATIONS: ICD-10-CM

## 2023-08-17 RX ORDER — METOPROLOL SUCCINATE 50 MG/1
50 TABLET, EXTENDED RELEASE ORAL EVERY EVENING
Qty: 30 TABLET | Refills: 1 | Status: SHIPPED | OUTPATIENT
Start: 2023-08-17

## 2023-09-05 ENCOUNTER — TELEPHONE (OUTPATIENT)
Dept: SLEEP CENTER | Age: 28
End: 2023-09-05

## 2023-09-05 NOTE — TELEPHONE ENCOUNTER
Call to pt LM on 8-30-23, 9-1-23 and 9-5-23 to reschedule his 10-26-23 appt with no return call.  LM that appt was cancelled and to call the office to reschedule

## 2023-09-24 ENCOUNTER — PATIENT MESSAGE (OUTPATIENT)
Dept: PRIMARY CARE CLINIC | Age: 28
End: 2023-09-24

## 2023-09-25 NOTE — TELEPHONE ENCOUNTER
Patient feels like due to recent issues with blood pressure and anxiety it would be best to be excused from jury duty

## 2023-09-28 ENCOUNTER — OFFICE VISIT (OUTPATIENT)
Dept: PRIMARY CARE CLINIC | Age: 28
End: 2023-09-28
Payer: COMMERCIAL

## 2023-09-28 VITALS
BODY MASS INDEX: 34.99 KG/M2 | OXYGEN SATURATION: 97 % | WEIGHT: 258 LBS | TEMPERATURE: 97.5 F | HEART RATE: 76 BPM | DIASTOLIC BLOOD PRESSURE: 78 MMHG | SYSTOLIC BLOOD PRESSURE: 136 MMHG

## 2023-09-28 DIAGNOSIS — R79.89 ELEVATED LFTS: ICD-10-CM

## 2023-09-28 DIAGNOSIS — I70.1 RENAL ARTERY STENOSIS (HCC): ICD-10-CM

## 2023-09-28 DIAGNOSIS — E04.1 THYROID NODULE: ICD-10-CM

## 2023-09-28 DIAGNOSIS — I10 ESSENTIAL HYPERTENSION: Primary | ICD-10-CM

## 2023-09-28 DIAGNOSIS — F41.9 ANXIETY AND DEPRESSION: ICD-10-CM

## 2023-09-28 DIAGNOSIS — G47.33 OSA (OBSTRUCTIVE SLEEP APNEA): ICD-10-CM

## 2023-09-28 DIAGNOSIS — E78.2 MIXED HYPERLIPIDEMIA: ICD-10-CM

## 2023-09-28 DIAGNOSIS — F32.A ANXIETY AND DEPRESSION: ICD-10-CM

## 2023-09-28 PROCEDURE — 1036F TOBACCO NON-USER: CPT | Performed by: FAMILY MEDICINE

## 2023-09-28 PROCEDURE — 3075F SYST BP GE 130 - 139MM HG: CPT | Performed by: FAMILY MEDICINE

## 2023-09-28 PROCEDURE — G8417 CALC BMI ABV UP PARAM F/U: HCPCS | Performed by: FAMILY MEDICINE

## 2023-09-28 PROCEDURE — 3078F DIAST BP <80 MM HG: CPT | Performed by: FAMILY MEDICINE

## 2023-09-28 PROCEDURE — G8427 DOCREV CUR MEDS BY ELIG CLIN: HCPCS | Performed by: FAMILY MEDICINE

## 2023-09-28 PROCEDURE — 99214 OFFICE O/P EST MOD 30 MIN: CPT | Performed by: FAMILY MEDICINE

## 2023-09-28 RX ORDER — PRAVASTATIN SODIUM 20 MG
20 TABLET ORAL NIGHTLY
Qty: 30 TABLET | Refills: 3 | Status: SHIPPED | OUTPATIENT
Start: 2023-09-28

## 2023-09-28 RX ORDER — METOPROLOL SUCCINATE 50 MG/1
50 TABLET, EXTENDED RELEASE ORAL EVERY EVENING
Qty: 30 TABLET | Refills: 3 | Status: SHIPPED | OUTPATIENT
Start: 2023-09-28

## 2023-09-28 NOTE — PROGRESS NOTES
Adolph Rea : 1995 Sex: male  Age: 24 y.o. Chief Complaint   Patient presents with    3 Month Follow-Up       HPI  HPI:    Presents today with wife. Expecting another baby. Did not get blood work. Needs letter for jury duty. Needs refills. Sleep study got denied as \"not medically necessary\" although sleep study was positive with an overall AHI 24.3, stage R sleep 68.3, minimum oxygen saturation down to 83%. It was recommended that he start auto CPAP. Unfortunately insurance denied study so they were nervous about moving forward with a CPAP which is an important component to his treatment. Continued weight loss and avoidance of supine sleep and also reviewed. Goal is to have an AHI less than or equal to 5. He should follow-up with sleep specialist, but patient unfortunately returned CPAP and did not follow-up. He should      He states he did not hear anything about the tilt table ultrasound or GI referral from [facility]. I did encourage them to call [facility] however he is feeling much better overall      He has seen multiple specialist [facility] clinic. He states he basically redid the testing we did. Vascular specialist impression/plan    \"IMPRESSION:  Mr. Genny Nickerson is a 24year old male .    Hypertension appears to have uncontrolled despite 2 medications renal duplex is suggestive of fibromuscular dysplasia  Chest pain may be related to elevated blood pressure however has risk factors of high BMI and hypertension  Elevated BMI  Symptoms of dizziness fatigue palpitations rapid heart rate and near syncopal episode need to rule out orthostatic hypotension or venous insufficiency  PLAN AND RECOMMENDATIONS:  Support stockings  Referred to syncope center may require tilt table test  Home blood pressure monitoring  Renal artery duplex [facility]  We will discuss with Dr. Mahsa Chandler may require balloon angioplasty if fibromuscular dysplasia is confirmed to control blood pressure  Zio patch

## 2023-10-28 ENCOUNTER — HOSPITAL ENCOUNTER (OUTPATIENT)
Age: 28
Discharge: HOME | End: 2023-10-28
Payer: COMMERCIAL

## 2023-10-28 DIAGNOSIS — E78.2 MIXED HYPERLIPIDEMIA: ICD-10-CM

## 2023-10-28 LAB
ALBUMIN SERPL-MCNC: 4.5 G/DL (ref 3.5–5.2)
ALP SERPL-CCNC: 74 U/L (ref 40–129)
ALT SERPL-CCNC: 45 U/L (ref 0–40)
ANION GAP SERPL CALCULATED.3IONS-SCNC: 8 MMOL/L (ref 7–16)
AST SERPL-CCNC: 30 U/L (ref 0–39)
BILIRUB SERPL-MCNC: 0.8 MG/DL (ref 0–1.2)
BUN SERPL-MCNC: 9 MG/DL (ref 6–20)
CALCIUM SERPL-MCNC: 9.4 MG/DL (ref 8.6–10.2)
CHLORIDE SERPL-SCNC: 101 MMOL/L (ref 98–107)
CHOLEST SERPL-MCNC: 212 MG/DL
CK SERPL-CCNC: 119 U/L (ref 20–200)
CO2 SERPL-SCNC: 29 MMOL/L (ref 22–29)
CREAT SERPL-MCNC: 0.9 MG/DL (ref 0.7–1.2)
GFR SERPL CREATININE-BSD FRML MDRD: >60 ML/MIN/1.73M2
GLUCOSE SERPL-MCNC: 97 MG/DL (ref 74–99)
HDLC SERPL-MCNC: 48 MG/DL
LDLC SERPL CALC-MCNC: 132 MG/DL
POTASSIUM SERPL-SCNC: 4.2 MMOL/L (ref 3.5–5)
PROT SERPL-MCNC: 7.6 G/DL (ref 6.4–8.3)
SODIUM SERPL-SCNC: 138 MMOL/L (ref 132–146)
TRIGL SERPL-MCNC: 158 MG/DL
VLDLC SERPL CALC-MCNC: 32 MG/DL

## 2023-10-28 PROCEDURE — 82550 ASSAY OF CK (CPK): CPT

## 2023-10-28 PROCEDURE — 80053 COMPREHEN METABOLIC PANEL: CPT

## 2023-10-28 PROCEDURE — 80061 LIPID PANEL: CPT

## 2023-10-30 ENCOUNTER — OFFICE VISIT (OUTPATIENT)
Dept: PRIMARY CARE CLINIC | Age: 28
End: 2023-10-30
Payer: COMMERCIAL

## 2023-10-30 VITALS
HEART RATE: 100 BPM | SYSTOLIC BLOOD PRESSURE: 138 MMHG | WEIGHT: 256 LBS | OXYGEN SATURATION: 98 % | BODY MASS INDEX: 34.72 KG/M2 | TEMPERATURE: 98.2 F | DIASTOLIC BLOOD PRESSURE: 88 MMHG

## 2023-10-30 DIAGNOSIS — R79.89 ELEVATED LFTS: ICD-10-CM

## 2023-10-30 DIAGNOSIS — F34.1 DYSTHYMIA: ICD-10-CM

## 2023-10-30 DIAGNOSIS — E78.2 MIXED HYPERLIPIDEMIA: ICD-10-CM

## 2023-10-30 DIAGNOSIS — I10 ESSENTIAL HYPERTENSION: Primary | ICD-10-CM

## 2023-10-30 DIAGNOSIS — E04.1 THYROID NODULE: ICD-10-CM

## 2023-10-30 DIAGNOSIS — G47.33 OSA (OBSTRUCTIVE SLEEP APNEA): ICD-10-CM

## 2023-10-30 PROCEDURE — G8484 FLU IMMUNIZE NO ADMIN: HCPCS | Performed by: FAMILY MEDICINE

## 2023-10-30 PROCEDURE — 1036F TOBACCO NON-USER: CPT | Performed by: FAMILY MEDICINE

## 2023-10-30 PROCEDURE — 99214 OFFICE O/P EST MOD 30 MIN: CPT | Performed by: FAMILY MEDICINE

## 2023-10-30 PROCEDURE — G8417 CALC BMI ABV UP PARAM F/U: HCPCS | Performed by: FAMILY MEDICINE

## 2023-10-30 PROCEDURE — 3079F DIAST BP 80-89 MM HG: CPT | Performed by: FAMILY MEDICINE

## 2023-10-30 PROCEDURE — 3075F SYST BP GE 130 - 139MM HG: CPT | Performed by: FAMILY MEDICINE

## 2023-10-30 PROCEDURE — G8427 DOCREV CUR MEDS BY ELIG CLIN: HCPCS | Performed by: FAMILY MEDICINE

## 2023-10-30 RX ORDER — AMLODIPINE BESYLATE 10 MG/1
10 TABLET ORAL DAILY
Qty: 30 TABLET | Refills: 0
Start: 2023-10-30

## 2023-10-30 RX ORDER — LISINOPRIL 20 MG/1
20 TABLET ORAL DAILY
Qty: 30 TABLET | Refills: 1
Start: 2023-10-30

## 2023-10-30 NOTE — PROGRESS NOTES
specialist recommended auto CPAP which he returned. Risk of untreated PATO reviewed. General precautions reviewed including dry precautions. We wrote letter of appeal as his sleep study was denied despite being positive. He should follow-up with sleep specialty         No data to display                Plan as above. Counseled extensively and differential diagnoses relevant to above were reviewed, including serious etiologies, risks and complications, especially of left uncontrolled. If relevant, instructions and  alternatives to meds/treatment reviewed, as well as interactions, and  SE's/ADRs reviewed, notify immediately if any, discontinuing new meds if any. Plan made after discussion and shared decision making. Counseled. Refilled Zoloft. States doing well in this regard. Continue per multiple specialists. Encourage follow-up sleep specialty. [place] recently increased lisinopril from 10 to 20 mg with the option of going up to 40 mg but is going to hold off on this latter dose until follow-up with me which will be 3 weeks from now sooner as needed. Plan additional blood work 3 months sooner as needed      as long as symptoms steadily improve/resolve, and medical conditions follow the expected course, FU as below, sooner PRN. Return in about 3 weeks (around 11/20/2023), or if symptoms worsen or fail to improve, for to fu on higher dose lisinopril; bw and fu 3mos. Educational materials and/or home exercises printed for patient's review and were included in patient instructions on his/her After Visit Summary and given to patient at the end of visit. Follow-up this week, he prefers Thursday or Friday sooner as needed. After discussion, patient and/or guardian verbalizes understanding, agrees, feels comfortable with and wishes to proceed with above treatment plan. Call for any pending results, FU sooner if abnormal, as needed or if any current symptoms persist/worsen.       Advised

## 2023-11-10 DIAGNOSIS — I10 ESSENTIAL HYPERTENSION: ICD-10-CM

## 2023-11-10 RX ORDER — LISINOPRIL 20 MG/1
20 TABLET ORAL DAILY
Qty: 30 TABLET | Refills: 1 | Status: CANCELLED | OUTPATIENT
Start: 2023-11-10

## 2023-11-27 ENCOUNTER — OFFICE VISIT (OUTPATIENT)
Dept: PRIMARY CARE CLINIC | Age: 28
End: 2023-11-27
Payer: COMMERCIAL

## 2023-11-27 VITALS
OXYGEN SATURATION: 98 % | DIASTOLIC BLOOD PRESSURE: 88 MMHG | TEMPERATURE: 97.7 F | SYSTOLIC BLOOD PRESSURE: 138 MMHG | WEIGHT: 254 LBS | HEART RATE: 88 BPM | BODY MASS INDEX: 34.45 KG/M2

## 2023-11-27 DIAGNOSIS — K62.5 RECTAL BLEEDING: ICD-10-CM

## 2023-11-27 DIAGNOSIS — I10 ESSENTIAL HYPERTENSION: Primary | ICD-10-CM

## 2023-11-27 DIAGNOSIS — I10 ESSENTIAL HYPERTENSION: ICD-10-CM

## 2023-11-27 DIAGNOSIS — K64.9 BLEEDING HEMORRHOIDS: ICD-10-CM

## 2023-11-27 LAB
ANION GAP SERPL CALCULATED.3IONS-SCNC: 14 MMOL/L (ref 7–16)
BUN BLDV-MCNC: 14 MG/DL (ref 6–20)
CALCIUM SERPL-MCNC: 9.8 MG/DL (ref 8.6–10.2)
CHLORIDE BLD-SCNC: 99 MMOL/L (ref 98–107)
CO2: 25 MMOL/L (ref 22–29)
CREAT SERPL-MCNC: 0.9 MG/DL (ref 0.7–1.2)
GFR SERPL CREATININE-BSD FRML MDRD: >60 ML/MIN/1.73M2
GLUCOSE BLD-MCNC: 78 MG/DL (ref 74–99)
HCT VFR BLD CALC: 44.2 % (ref 37–54)
HEMOGLOBIN: 15.4 G/DL (ref 12.5–16.5)
POTASSIUM SERPL-SCNC: 4.3 MMOL/L (ref 3.5–5)
SODIUM BLD-SCNC: 138 MMOL/L (ref 132–146)

## 2023-11-27 PROCEDURE — G8417 CALC BMI ABV UP PARAM F/U: HCPCS | Performed by: FAMILY MEDICINE

## 2023-11-27 PROCEDURE — G8484 FLU IMMUNIZE NO ADMIN: HCPCS | Performed by: FAMILY MEDICINE

## 2023-11-27 PROCEDURE — 3079F DIAST BP 80-89 MM HG: CPT | Performed by: FAMILY MEDICINE

## 2023-11-27 PROCEDURE — 99214 OFFICE O/P EST MOD 30 MIN: CPT | Performed by: FAMILY MEDICINE

## 2023-11-27 PROCEDURE — 3075F SYST BP GE 130 - 139MM HG: CPT | Performed by: FAMILY MEDICINE

## 2023-11-27 PROCEDURE — 1036F TOBACCO NON-USER: CPT | Performed by: FAMILY MEDICINE

## 2023-11-27 PROCEDURE — G8427 DOCREV CUR MEDS BY ELIG CLIN: HCPCS | Performed by: FAMILY MEDICINE

## 2023-11-27 RX ORDER — DOCUSATE SODIUM 100 MG/1
CAPSULE, LIQUID FILLED ORAL
Qty: 30 CAPSULE | Refills: 0 | Status: SHIPPED | OUTPATIENT
Start: 2023-11-27

## 2023-11-27 NOTE — PROGRESS NOTES
0.8 07/15/2023 12:07 PM    BILITOT 0.5 04/19/2023 04:36 PM    ALKPHOS 74 10/28/2023 11:11 AM    ALKPHOS 70 07/15/2023 12:07 PM    ALKPHOS 63 04/19/2023 04:36 PM    AST 30 10/28/2023 11:11 AM    AST 24 07/15/2023 12:07 PM    AST 41 04/19/2023 04:36 PM    ALT 45 10/28/2023 11:11 AM    ALT 50 07/15/2023 12:07 PM    ALT 76 04/19/2023 04:36 PM     A1C  No results found for: \"LABA1C\"  TSH  Lab Results   Component Value Date/Time    TSH 1.620 03/19/2023 11:10 AM    TSH 1.270 11/26/2022 10:38 AM    TSH 1.90 07/02/2020 12:00 AM     FREET4  Lab Results   Component Value Date/Time    N8TEUVN 6.2 03/19/2023 11:10 AM     LIPID  Lab Results   Component Value Date/Time    CHOL 212 10/28/2023 11:11 AM    CHOL 219 07/15/2023 12:07 PM    CHOL 225 11/26/2022 10:38 AM    CHOL 192 04/15/2021 11:24 AM    CHOL 202 01/21/2021 11:01 AM    HDL 48 10/28/2023 11:11 AM    HDL 43 07/15/2023 12:07 PM    HDL 43 11/26/2022 10:38 AM    LDLCALC 147 11/26/2022 10:38 AM    LDLCALC 123 04/15/2021 11:24 AM    LDLCALC 106 01/21/2021 11:01 AM    TRIG 158 10/28/2023 11:11 AM    TRIG 192 07/15/2023 12:07 PM    TRIG 176 11/26/2022 10:38 AM    CHOLHDLRATIO 6.1 07/02/2020 12:00 AM    CHOLHDLRATIO 5.3 02/26/2019 12:00 AM    VLDL 32 10/28/2023 11:11 AM    VLDL 38 07/15/2023 12:07 PM    VLDL 168 02/26/2019 12:00 AM     VITAMIN D  No results found for: \"VITD25\"  MAGNESIUM  No results found for: \"MG\"   PHOS  No results found for: \"PHOS\"   FELIX   No results found for: \"FELIX\"  RHEUMATOID FACTOR  No results found for: \"RF\"  PSA  No results found for: \"PSA\"   HEPATITIS C  Lab Results   Component Value Date/Time    HCVABI Non-Reactive 04/15/2021 11:24 AM     HIV  No results found for: \"KFU3ZLH\", \"HIV1QT\"  UA  Lab Results   Component Value Date/Time    COLORU Yellow 11/26/2022 10:32 AM    CLARITYU Clear 11/26/2022 10:32 AM    GLUCOSEU Negative 11/26/2022 10:32 AM    BILIRUBINUR Negative 11/26/2022 10:32 AM    KETUA Negative 11/26/2022 10:32 AM    SPECGRAV 1.020 11/26/2022

## 2023-12-12 ENCOUNTER — OFFICE VISIT (OUTPATIENT)
Dept: PRIMARY CARE CLINIC | Age: 28
End: 2023-12-12
Payer: COMMERCIAL

## 2023-12-12 VITALS
OXYGEN SATURATION: 97 % | TEMPERATURE: 98.3 F | SYSTOLIC BLOOD PRESSURE: 138 MMHG | BODY MASS INDEX: 35.13 KG/M2 | HEART RATE: 97 BPM | WEIGHT: 259 LBS | DIASTOLIC BLOOD PRESSURE: 84 MMHG

## 2023-12-12 DIAGNOSIS — Z23 ENCOUNTER FOR IMMUNIZATION: ICD-10-CM

## 2023-12-12 DIAGNOSIS — G47.33 OSA (OBSTRUCTIVE SLEEP APNEA): ICD-10-CM

## 2023-12-12 DIAGNOSIS — K64.9 BLEEDING HEMORRHOIDS: ICD-10-CM

## 2023-12-12 DIAGNOSIS — I10 ESSENTIAL HYPERTENSION: ICD-10-CM

## 2023-12-12 DIAGNOSIS — K62.5 RECTAL BLEEDING: ICD-10-CM

## 2023-12-12 DIAGNOSIS — E78.2 MIXED HYPERLIPIDEMIA: ICD-10-CM

## 2023-12-12 DIAGNOSIS — F34.1 DYSTHYMIA: Primary | ICD-10-CM

## 2023-12-12 PROCEDURE — G8417 CALC BMI ABV UP PARAM F/U: HCPCS | Performed by: FAMILY MEDICINE

## 2023-12-12 PROCEDURE — G8482 FLU IMMUNIZE ORDER/ADMIN: HCPCS | Performed by: FAMILY MEDICINE

## 2023-12-12 PROCEDURE — 3079F DIAST BP 80-89 MM HG: CPT | Performed by: FAMILY MEDICINE

## 2023-12-12 PROCEDURE — 90674 CCIIV4 VAC NO PRSV 0.5 ML IM: CPT | Performed by: FAMILY MEDICINE

## 2023-12-12 PROCEDURE — G8427 DOCREV CUR MEDS BY ELIG CLIN: HCPCS | Performed by: FAMILY MEDICINE

## 2023-12-12 PROCEDURE — 99214 OFFICE O/P EST MOD 30 MIN: CPT | Performed by: FAMILY MEDICINE

## 2023-12-12 PROCEDURE — 90471 IMMUNIZATION ADMIN: CPT | Performed by: FAMILY MEDICINE

## 2023-12-12 PROCEDURE — 1036F TOBACCO NON-USER: CPT | Performed by: FAMILY MEDICINE

## 2023-12-12 PROCEDURE — 3075F SYST BP GE 130 - 139MM HG: CPT | Performed by: FAMILY MEDICINE

## 2023-12-12 NOTE — PROGRESS NOTES
and were included in patient instructions on his/her After Visit Summary and given to patient at the end of visit. Follow-up this week, he prefers Thursday or Friday sooner as needed. After discussion, patient and/or guardian verbalizes understanding, agrees, feels comfortable with and wishes to proceed with above treatment plan. Call for any pending results, FU sooner if abnormal, as needed or if any current symptoms persist/worsen. Advised patient to call with any new medication issues, and read all Rx info from pharmacy to assure aware of all possible risks and side effects of medication before taking. Reviewed age and gender appropriate health screening exams and vaccinations. Advised patient regarding importance of keeping up with recommended health maintenance and to schedule as soon as possible if overdue, as this is important in assessing for undiagnosed pathology, especially cancer, as well as protecting against potentially harmful/life threatening disease. Patient and/or guardian verbalizes understanding and agrees with above counseling, assessment and plan. All questions answered. Information written down        Signs and symptoms to watch for discussed, serious signs and symptoms reviewed. ER if any. Rigo Moore MD    Patients are advised to check with insurance company to ensure coverage and to fully understand benefits and cost prior to any testing. This note was created with the assistance of voice recognition software. Document was reviewed however may contain grammatical errors.

## 2023-12-30 ENCOUNTER — HOSPITAL ENCOUNTER (OUTPATIENT)
Dept: ULTRASOUND IMAGING | Age: 28
End: 2023-12-30
Payer: COMMERCIAL

## 2023-12-30 DIAGNOSIS — E04.1 THYROID NODULE: ICD-10-CM

## 2023-12-30 PROCEDURE — 76536 US EXAM OF HEAD AND NECK: CPT

## 2024-02-05 ENCOUNTER — HOSPITAL ENCOUNTER (OUTPATIENT)
Age: 29
Discharge: HOME | End: 2024-02-05
Payer: COMMERCIAL

## 2024-02-05 DIAGNOSIS — E78.2 MIXED HYPERLIPIDEMIA: ICD-10-CM

## 2024-02-05 LAB
ALBUMIN SERPL-MCNC: 4.5 G/DL (ref 3.5–5.2)
ALP SERPL-CCNC: 63 U/L (ref 40–129)
ALT SERPL-CCNC: 36 U/L (ref 0–40)
ANION GAP SERPL CALCULATED.3IONS-SCNC: 13 MMOL/L (ref 7–16)
AST SERPL-CCNC: 25 U/L (ref 0–39)
BILIRUB SERPL-MCNC: 0.4 MG/DL (ref 0–1.2)
BUN SERPL-MCNC: 15 MG/DL (ref 6–20)
CALCIUM SERPL-MCNC: 9.4 MG/DL (ref 8.6–10.2)
CHLORIDE SERPL-SCNC: 99 MMOL/L (ref 98–107)
CHOLEST SERPL-MCNC: 244 MG/DL
CK SERPL-CCNC: 182 U/L (ref 20–200)
CO2 SERPL-SCNC: 24 MMOL/L (ref 22–29)
CREAT SERPL-MCNC: 1 MG/DL (ref 0.7–1.2)
GFR SERPL CREATININE-BSD FRML MDRD: >60 ML/MIN/1.73M2
GLUCOSE SERPL-MCNC: 84 MG/DL (ref 74–99)
HDLC SERPL-MCNC: 42 MG/DL
LDLC SERPL CALC-MCNC: 142 MG/DL
POTASSIUM SERPL-SCNC: 4 MMOL/L (ref 3.5–5)
PROT SERPL-MCNC: 7.4 G/DL (ref 6.4–8.3)
SODIUM SERPL-SCNC: 136 MMOL/L (ref 132–146)
TRIGL SERPL-MCNC: 300 MG/DL
VLDLC SERPL CALC-MCNC: 60 MG/DL

## 2024-02-05 PROCEDURE — 82550 ASSAY OF CK (CPK): CPT

## 2024-02-05 PROCEDURE — 80061 LIPID PANEL: CPT

## 2024-02-05 PROCEDURE — 36415 COLL VENOUS BLD VENIPUNCTURE: CPT

## 2024-02-05 PROCEDURE — 80053 COMPREHEN METABOLIC PANEL: CPT

## 2024-02-06 ENCOUNTER — OFFICE VISIT (OUTPATIENT)
Dept: PRIMARY CARE CLINIC | Age: 29
End: 2024-02-06
Payer: COMMERCIAL

## 2024-02-06 VITALS
WEIGHT: 263 LBS | BODY MASS INDEX: 35.62 KG/M2 | DIASTOLIC BLOOD PRESSURE: 102 MMHG | OXYGEN SATURATION: 98 % | TEMPERATURE: 98.1 F | HEART RATE: 83 BPM | HEIGHT: 72 IN | SYSTOLIC BLOOD PRESSURE: 150 MMHG | RESPIRATION RATE: 20 BRPM

## 2024-02-06 DIAGNOSIS — F41.9 ANXIETY AND DEPRESSION: ICD-10-CM

## 2024-02-06 DIAGNOSIS — F32.A ANXIETY AND DEPRESSION: ICD-10-CM

## 2024-02-06 DIAGNOSIS — G47.33 OSA (OBSTRUCTIVE SLEEP APNEA): ICD-10-CM

## 2024-02-06 DIAGNOSIS — R79.89 ELEVATED LFTS: ICD-10-CM

## 2024-02-06 DIAGNOSIS — I10 ESSENTIAL HYPERTENSION: ICD-10-CM

## 2024-02-06 DIAGNOSIS — S83.92XA SPRAIN OF LEFT KNEE, UNSPECIFIED LIGAMENT, INITIAL ENCOUNTER: ICD-10-CM

## 2024-02-06 DIAGNOSIS — E78.2 MIXED HYPERLIPIDEMIA: Primary | ICD-10-CM

## 2024-02-06 DIAGNOSIS — E04.1 THYROID NODULE: ICD-10-CM

## 2024-02-06 PROCEDURE — G8427 DOCREV CUR MEDS BY ELIG CLIN: HCPCS | Performed by: FAMILY MEDICINE

## 2024-02-06 PROCEDURE — 99215 OFFICE O/P EST HI 40 MIN: CPT | Performed by: FAMILY MEDICINE

## 2024-02-06 PROCEDURE — G8417 CALC BMI ABV UP PARAM F/U: HCPCS | Performed by: FAMILY MEDICINE

## 2024-02-06 PROCEDURE — 3077F SYST BP >= 140 MM HG: CPT | Performed by: FAMILY MEDICINE

## 2024-02-06 PROCEDURE — 3080F DIAST BP >= 90 MM HG: CPT | Performed by: FAMILY MEDICINE

## 2024-02-06 PROCEDURE — G8482 FLU IMMUNIZE ORDER/ADMIN: HCPCS | Performed by: FAMILY MEDICINE

## 2024-02-06 PROCEDURE — 1036F TOBACCO NON-USER: CPT | Performed by: FAMILY MEDICINE

## 2024-02-06 RX ORDER — PRAVASTATIN SODIUM 40 MG
40 TABLET ORAL NIGHTLY
Qty: 30 TABLET | Refills: 1 | Status: SHIPPED | OUTPATIENT
Start: 2024-02-06

## 2024-02-06 RX ORDER — HYDROCHLOROTHIAZIDE 12.5 MG/1
12.5 CAPSULE, GELATIN COATED ORAL EVERY MORNING
Qty: 30 CAPSULE | Refills: 1 | Status: SHIPPED | OUTPATIENT
Start: 2024-02-06

## 2024-02-06 ASSESSMENT — PATIENT HEALTH QUESTIONNAIRE - PHQ9
SUM OF ALL RESPONSES TO PHQ QUESTIONS 1-9: 2
3. TROUBLE FALLING OR STAYING ASLEEP: 0
7. TROUBLE CONCENTRATING ON THINGS, SUCH AS READING THE NEWSPAPER OR WATCHING TELEVISION: 2
10. IF YOU CHECKED OFF ANY PROBLEMS, HOW DIFFICULT HAVE THESE PROBLEMS MADE IT FOR YOU TO DO YOUR WORK, TAKE CARE OF THINGS AT HOME, OR GET ALONG WITH OTHER PEOPLE: 1
SUM OF ALL RESPONSES TO PHQ9 QUESTIONS 1 & 2: 0
5. POOR APPETITE OR OVEREATING: 0
6. FEELING BAD ABOUT YOURSELF - OR THAT YOU ARE A FAILURE OR HAVE LET YOURSELF OR YOUR FAMILY DOWN: 0
4. FEELING TIRED OR HAVING LITTLE ENERGY: 0
2. FEELING DOWN, DEPRESSED OR HOPELESS: 0
8. MOVING OR SPEAKING SO SLOWLY THAT OTHER PEOPLE COULD HAVE NOTICED. OR THE OPPOSITE, BEING SO FIGETY OR RESTLESS THAT YOU HAVE BEEN MOVING AROUND A LOT MORE THAN USUAL: 0
SUM OF ALL RESPONSES TO PHQ QUESTIONS 1-9: 2
SUM OF ALL RESPONSES TO PHQ QUESTIONS 1-9: 2
9. THOUGHTS THAT YOU WOULD BE BETTER OFF DEAD, OR OF HURTING YOURSELF: 0
1. LITTLE INTEREST OR PLEASURE IN DOING THINGS: 0
SUM OF ALL RESPONSES TO PHQ QUESTIONS 1-9: 2

## 2024-02-06 NOTE — PROGRESS NOTES
4.87 03/19/2023 11:10 AM    RBC 5.36 11/26/2022 10:38 AM    HGB 15.4 11/27/2023 04:27 PM    HGB 15.5 04/19/2023 04:36 PM    HGB 15.1 03/19/2023 11:10 AM    HCT 44.2 11/27/2023 04:27 PM    HCT 44.6 04/19/2023 04:36 PM    HCT 42.8 03/19/2023 11:10 AM    MCV 86.9 04/19/2023 04:36 PM    MCV 87.9 03/19/2023 11:10 AM    MCV 87.1 11/26/2022 10:38 AM     04/19/2023 04:36 PM     03/19/2023 11:10 AM     11/26/2022 10:38 AM      CMP  Lab Results   Component Value Date/Time     02/05/2024 04:52 PM     11/27/2023 04:38 PM     10/28/2023 11:11 AM    K 4.0 02/05/2024 04:52 PM    K 4.3 11/27/2023 04:38 PM    K 4.2 10/28/2023 11:11 AM    K 4.0 04/19/2023 04:36 PM    CL 99 02/05/2024 04:52 PM    CL 99 11/27/2023 04:38 PM     10/28/2023 11:11 AM    CO2 24 02/05/2024 04:52 PM    CO2 25 11/27/2023 04:38 PM    CO2 29 10/28/2023 11:11 AM    ANIONGAP 13 02/05/2024 04:52 PM    ANIONGAP 14 11/27/2023 04:38 PM    ANIONGAP 8 10/28/2023 11:11 AM    GLUCOSE 84 02/05/2024 04:52 PM    GLUCOSE 78 11/27/2023 04:38 PM    GLUCOSE 97 10/28/2023 11:11 AM    BUN 15 02/05/2024 04:52 PM    BUN 14 11/27/2023 04:38 PM    BUN 9 10/28/2023 11:11 AM    CREATININE 1.0 02/05/2024 04:52 PM    CREATININE 0.9 11/27/2023 04:38 PM    CREATININE 0.9 10/28/2023 11:11 AM    LABGLOM >60 02/05/2024 04:52 PM    LABGLOM >60 11/27/2023 04:38 PM    LABGLOM >60 10/28/2023 11:11 AM    GFRAA >60 04/15/2021 11:24 AM    GFRAA >60 01/21/2021 11:01 AM    CALCIUM 9.4 02/05/2024 04:52 PM    CALCIUM 9.8 11/27/2023 04:38 PM    CALCIUM 9.4 10/28/2023 11:11 AM    PROT 7.4 02/05/2024 04:52 PM    PROT 7.6 10/28/2023 11:11 AM    PROT 7.8 07/15/2023 12:07 PM    LABALBU 4.5 02/05/2024 04:52 PM    LABALBU 4.5 10/28/2023 11:11 AM    LABALBU 4.5 07/15/2023 12:07 PM    BILITOT 0.4 02/05/2024 04:52 PM    BILITOT 0.8 10/28/2023 11:11 AM    BILITOT 0.8 07/15/2023 12:07 PM    ALKPHOS 63 02/05/2024 04:52 PM    ALKPHOS 74 10/28/2023 11:11 AM    ALKPHOS 70

## 2024-03-09 ENCOUNTER — HOSPITAL ENCOUNTER (OUTPATIENT)
Age: 29
Discharge: HOME | End: 2024-03-09
Payer: COMMERCIAL

## 2024-03-09 DIAGNOSIS — I10 ESSENTIAL HYPERTENSION: ICD-10-CM

## 2024-03-09 DIAGNOSIS — E78.2 MIXED HYPERLIPIDEMIA: ICD-10-CM

## 2024-03-09 LAB
ALBUMIN SERPL-MCNC: 4.3 G/DL (ref 3.5–5.2)
ALP SERPL-CCNC: 76 U/L (ref 40–129)
ALT SERPL-CCNC: 41 U/L (ref 0–40)
ANION GAP SERPL CALCULATED.3IONS-SCNC: 10 MMOL/L (ref 7–16)
ANION GAP SERPL CALCULATED.3IONS-SCNC: 11 MMOL/L (ref 7–16)
AST SERPL-CCNC: 26 U/L (ref 0–39)
BILIRUB SERPL-MCNC: 0.6 MG/DL (ref 0–1.2)
BUN SERPL-MCNC: 8 MG/DL (ref 6–20)
BUN SERPL-MCNC: 8 MG/DL (ref 6–20)
CALCIUM SERPL-MCNC: 9.4 MG/DL (ref 8.6–10.2)
CALCIUM SERPL-MCNC: 9.4 MG/DL (ref 8.6–10.2)
CHLORIDE SERPL-SCNC: 102 MMOL/L (ref 98–107)
CHLORIDE SERPL-SCNC: 103 MMOL/L (ref 98–107)
CHOLEST SERPL-MCNC: 211 MG/DL
CK SERPL-CCNC: 138 U/L (ref 20–200)
CO2 SERPL-SCNC: 26 MMOL/L (ref 22–29)
CO2 SERPL-SCNC: 27 MMOL/L (ref 22–29)
CREAT SERPL-MCNC: 0.8 MG/DL (ref 0.7–1.2)
CREAT SERPL-MCNC: 0.8 MG/DL (ref 0.7–1.2)
GFR SERPL CREATININE-BSD FRML MDRD: >60 ML/MIN/1.73M2
GFR SERPL CREATININE-BSD FRML MDRD: >60 ML/MIN/1.73M2
GLUCOSE SERPL-MCNC: 85 MG/DL (ref 74–99)
GLUCOSE SERPL-MCNC: 87 MG/DL (ref 74–99)
HDLC SERPL-MCNC: 39 MG/DL
LDLC SERPL CALC-MCNC: 123 MG/DL
POTASSIUM SERPL-SCNC: 4 MMOL/L (ref 3.5–5)
POTASSIUM SERPL-SCNC: 4 MMOL/L (ref 3.5–5)
PROT SERPL-MCNC: 7.5 G/DL (ref 6.4–8.3)
SODIUM SERPL-SCNC: 139 MMOL/L (ref 132–146)
SODIUM SERPL-SCNC: 140 MMOL/L (ref 132–146)
TRIGL SERPL-MCNC: 247 MG/DL
VLDLC SERPL CALC-MCNC: 49 MG/DL

## 2024-03-09 PROCEDURE — 80061 LIPID PANEL: CPT

## 2024-03-09 PROCEDURE — 80053 COMPREHEN METABOLIC PANEL: CPT

## 2024-03-09 PROCEDURE — 82550 ASSAY OF CK (CPK): CPT

## 2024-03-09 PROCEDURE — 80048 BASIC METABOLIC PNL TOTAL CA: CPT

## 2024-03-09 PROCEDURE — 36415 COLL VENOUS BLD VENIPUNCTURE: CPT

## 2024-03-14 ENCOUNTER — OFFICE VISIT (OUTPATIENT)
Dept: PRIMARY CARE CLINIC | Age: 29
End: 2024-03-14
Payer: COMMERCIAL

## 2024-03-14 VITALS
DIASTOLIC BLOOD PRESSURE: 82 MMHG | WEIGHT: 258 LBS | HEART RATE: 84 BPM | BODY MASS INDEX: 34.99 KG/M2 | SYSTOLIC BLOOD PRESSURE: 144 MMHG | OXYGEN SATURATION: 98 % | TEMPERATURE: 98.3 F

## 2024-03-14 DIAGNOSIS — L98.9 ARM SKIN LESION, LEFT: ICD-10-CM

## 2024-03-14 DIAGNOSIS — R79.89 ELEVATED LFTS: ICD-10-CM

## 2024-03-14 DIAGNOSIS — E04.1 THYROID NODULE: ICD-10-CM

## 2024-03-14 DIAGNOSIS — F32.A ANXIETY AND DEPRESSION: ICD-10-CM

## 2024-03-14 DIAGNOSIS — I70.1 RENAL ARTERY STENOSIS (HCC): ICD-10-CM

## 2024-03-14 DIAGNOSIS — F41.9 ANXIETY AND DEPRESSION: ICD-10-CM

## 2024-03-14 DIAGNOSIS — G47.33 OSA (OBSTRUCTIVE SLEEP APNEA): ICD-10-CM

## 2024-03-14 DIAGNOSIS — E78.2 MIXED HYPERLIPIDEMIA: ICD-10-CM

## 2024-03-14 DIAGNOSIS — L60.0 INGROWN TOENAIL OF LEFT FOOT: ICD-10-CM

## 2024-03-14 DIAGNOSIS — I10 ESSENTIAL HYPERTENSION: Primary | ICD-10-CM

## 2024-03-14 PROCEDURE — 99214 OFFICE O/P EST MOD 30 MIN: CPT | Performed by: FAMILY MEDICINE

## 2024-03-14 PROCEDURE — 1036F TOBACCO NON-USER: CPT | Performed by: FAMILY MEDICINE

## 2024-03-14 PROCEDURE — G8427 DOCREV CUR MEDS BY ELIG CLIN: HCPCS | Performed by: FAMILY MEDICINE

## 2024-03-14 PROCEDURE — G8482 FLU IMMUNIZE ORDER/ADMIN: HCPCS | Performed by: FAMILY MEDICINE

## 2024-03-14 PROCEDURE — 3077F SYST BP >= 140 MM HG: CPT | Performed by: FAMILY MEDICINE

## 2024-03-14 PROCEDURE — G8417 CALC BMI ABV UP PARAM F/U: HCPCS | Performed by: FAMILY MEDICINE

## 2024-03-14 PROCEDURE — 3079F DIAST BP 80-89 MM HG: CPT | Performed by: FAMILY MEDICINE

## 2024-03-14 RX ORDER — DOXYCYCLINE HYCLATE 100 MG/1
100 CAPSULE ORAL 2 TIMES DAILY
Qty: 20 CAPSULE | Refills: 0 | Status: SHIPPED | OUTPATIENT
Start: 2024-03-14 | End: 2024-03-24

## 2024-03-14 RX ORDER — PRAVASTATIN SODIUM 40 MG
40 TABLET ORAL NIGHTLY
Qty: 30 TABLET | Refills: 3 | Status: SHIPPED | OUTPATIENT
Start: 2024-03-14

## 2024-03-14 RX ORDER — METOPROLOL SUCCINATE 50 MG/1
TABLET, EXTENDED RELEASE ORAL
Qty: 45 TABLET | Refills: 1 | Status: SHIPPED | OUTPATIENT
Start: 2024-03-14

## 2024-03-14 RX ORDER — HYDROCHLOROTHIAZIDE 12.5 MG/1
12.5 CAPSULE, GELATIN COATED ORAL EVERY MORNING
Qty: 30 CAPSULE | Refills: 3 | Status: SHIPPED | OUTPATIENT
Start: 2024-03-14

## 2024-03-14 NOTE — PROGRESS NOTES
pressure or the mask.  We discussed different masks and also consideration of BiPAP.  Risk of untreated PATO reviewed.  General precautions reviewed including dry precautions.    Risk of untreated PATO reviewed.  Emphasize importance of following up with sleep special list         No data to display                Plan as above.  Counseled extensively and differential diagnoses relevant to above were reviewed, including serious etiologies, risks and complications, especially of left uncontrolled.  If relevant, instructions and  alternatives to meds/treatment reviewed, as well as interactions, and  SE's/ADRs reviewed, notify immediately if any, discontinuing new meds if any.  Plan made after discussion and shared decision making.    Counseled.  ImprovedSummary increase metoprolol from 50 mg q. evening to 25 mg in the morning 50 mg in the evening, refill hydrochlorothiazide and pravastatin.  Doxycycline.  Bactroban.  He will see his brother-in-law who is a podiatrist.  Follow-up 3 weeks to recheck toe arm and blood pressure as well as blood work and follow-up 3 months sooner as needed    Return in about 3 weeks (around 4/4/2024), or if symptoms worsen or fail to improve.         Educational materials and/or home exercises printed for patient's review and were included in patient instructions on his/her After Visit Summary and given to patient at the end of visit.  Follow-up this week, he prefers Thursday or Friday sooner as needed.     After discussion, patient and/or guardian verbalizes understanding, agrees, feels comfortable with and wishes to proceed with above treatment plan. Call for any pending results, FU sooner if abnormal, as needed or if any current symptoms persist/worsen.      Advised patient to call with any new medication issues, and read all Rx info from pharmacy to assure aware of all possible risks and side effects of medication before taking.     Reviewed age and gender appropriate health screening

## 2024-04-16 RX ORDER — EPINEPHRINE 0.3 MG/.3ML
INJECTION SUBCUTANEOUS
Qty: 1 EACH | Refills: 1 | Status: SHIPPED | OUTPATIENT
Start: 2024-04-16

## 2024-04-25 ENCOUNTER — OFFICE VISIT (OUTPATIENT)
Dept: PRIMARY CARE CLINIC | Age: 29
End: 2024-04-25
Payer: COMMERCIAL

## 2024-04-25 VITALS
BODY MASS INDEX: 35.62 KG/M2 | OXYGEN SATURATION: 98 % | HEIGHT: 72 IN | TEMPERATURE: 97.9 F | RESPIRATION RATE: 18 BRPM | HEART RATE: 75 BPM | DIASTOLIC BLOOD PRESSURE: 90 MMHG | SYSTOLIC BLOOD PRESSURE: 148 MMHG | WEIGHT: 263 LBS

## 2024-04-25 DIAGNOSIS — I10 ESSENTIAL HYPERTENSION: Primary | ICD-10-CM

## 2024-04-25 DIAGNOSIS — E78.2 MIXED HYPERLIPIDEMIA: ICD-10-CM

## 2024-04-25 DIAGNOSIS — F34.1 DYSTHYMIA: ICD-10-CM

## 2024-04-25 DIAGNOSIS — J30.9 ALLERGIC RHINITIS, UNSPECIFIED SEASONALITY, UNSPECIFIED TRIGGER: ICD-10-CM

## 2024-04-25 DIAGNOSIS — I70.1 RENAL ARTERY STENOSIS (HCC): ICD-10-CM

## 2024-04-25 PROCEDURE — 3080F DIAST BP >= 90 MM HG: CPT | Performed by: FAMILY MEDICINE

## 2024-04-25 PROCEDURE — 99214 OFFICE O/P EST MOD 30 MIN: CPT | Performed by: FAMILY MEDICINE

## 2024-04-25 PROCEDURE — G8427 DOCREV CUR MEDS BY ELIG CLIN: HCPCS | Performed by: FAMILY MEDICINE

## 2024-04-25 PROCEDURE — G8417 CALC BMI ABV UP PARAM F/U: HCPCS | Performed by: FAMILY MEDICINE

## 2024-04-25 PROCEDURE — 3077F SYST BP >= 140 MM HG: CPT | Performed by: FAMILY MEDICINE

## 2024-04-25 PROCEDURE — 1036F TOBACCO NON-USER: CPT | Performed by: FAMILY MEDICINE

## 2024-04-25 RX ORDER — PRAVASTATIN SODIUM 40 MG
40 TABLET ORAL NIGHTLY
Qty: 90 TABLET | Refills: 1 | Status: SHIPPED | OUTPATIENT
Start: 2024-04-25

## 2024-04-25 RX ORDER — LISINOPRIL 20 MG/1
20 TABLET ORAL DAILY
Qty: 90 TABLET | Refills: 1 | Status: SHIPPED | OUTPATIENT
Start: 2024-04-25

## 2024-04-25 RX ORDER — AMLODIPINE BESYLATE 10 MG/1
10 TABLET ORAL DAILY
Qty: 90 TABLET | Refills: 1 | Status: SHIPPED | OUTPATIENT
Start: 2024-04-25

## 2024-04-25 RX ORDER — METOPROLOL SUCCINATE 50 MG/1
50 TABLET, EXTENDED RELEASE ORAL 2 TIMES DAILY
Qty: 180 TABLET | Refills: 1 | Status: SHIPPED | OUTPATIENT
Start: 2024-04-25

## 2024-04-25 RX ORDER — CETIRIZINE HYDROCHLORIDE 10 MG/1
10 TABLET ORAL DAILY PRN
Qty: 90 TABLET | Refills: 1 | Status: SHIPPED | OUTPATIENT
Start: 2024-04-25

## 2024-04-25 RX ORDER — HYDROCHLOROTHIAZIDE 12.5 MG/1
12.5 CAPSULE, GELATIN COATED ORAL EVERY MORNING
Qty: 90 CAPSULE | Refills: 1 | Status: SHIPPED | OUTPATIENT
Start: 2024-04-25

## 2024-04-25 NOTE — PROGRESS NOTES
Anuel Rodriguez : 1995 Sex: male  Age: 28 y.o.    Chief Complaint   Patient presents with    Hypertension       HPI  HPI:      Presents for follow-up.  Feels well.  Blood pressures were in the 130s over 70s to 80s but now that wife being induced early next week blood pressures been in the 140s over 90s.  No blood work for this appointment.                Review of Systems  ROS:  Const: Denies chills, fever, malaise and sweats.  Eyes: Denies discharge, pain, redness and visual disturbance.  ENMT: Denies earaches, other ear symptoms. Denies nasal or sinus symptoms other than stated  above. Denies mouth and tongue lesions and sore throat.  CV: Denies chest discomfort, pain; diaphoresis, dizziness, edema, lightheadedness, orthopnea,  palpitations, syncope and near syncopal episode or any exertional symptoms  Resp: Denies cough, hemoptysis, pleuritic pain, SOB, sputum production and wheezing.  GI: Denies abdominal pain, change in bowel habits, hematochezia, melena, nausea and vomiting.  : Denies urinary symptoms including dysuria , urgency, frequency or hematuria.  Musculo: Denies musculoskeletal symptoms.    Skin: Denies bruising and rash.    Neuro: Denies headache, numbness, stiff neck, tingling and focal weakness slurred speech or facial  droop  Hema/Lymph: Denies bleeding/bruising tendency and enlarged lymph nodes        Most Recent Labs  CBC  Lab Results   Component Value Date/Time    WBC 9.5 2023 04:36 PM    WBC 7.3 2023 11:10 AM    WBC 7.0 2022 10:38 AM    RBC 5.13 2023 04:36 PM    RBC 4.87 2023 11:10 AM    RBC 5.36 2022 10:38 AM    HGB 15.4 2023 04:27 PM    HGB 15.5 2023 04:36 PM    HGB 15.1 2023 11:10 AM    HCT 44.2 2023 04:27 PM    HCT 44.6 2023 04:36 PM    HCT 42.8 2023 11:10 AM    MCV 86.9 2023 04:36 PM    MCV 87.9 2023 11:10 AM    MCV 87.1 2022 10:38 AM     2023 04:36 PM     2023

## 2024-05-25 ENCOUNTER — HOSPITAL ENCOUNTER (OUTPATIENT)
Age: 29
Discharge: HOME | End: 2024-05-25
Payer: COMMERCIAL

## 2024-05-25 DIAGNOSIS — E04.1 THYROID NODULE: ICD-10-CM

## 2024-05-25 DIAGNOSIS — E78.2 MIXED HYPERLIPIDEMIA: ICD-10-CM

## 2024-05-25 LAB
ALBUMIN SERPL-MCNC: 4.7 G/DL (ref 3.5–5.2)
ALP SERPL-CCNC: 69 U/L (ref 40–129)
ALT SERPL-CCNC: 51 U/L (ref 0–40)
ANION GAP SERPL CALCULATED.3IONS-SCNC: 10 MMOL/L (ref 7–16)
AST SERPL-CCNC: 28 U/L (ref 0–39)
BILIRUB SERPL-MCNC: 0.7 MG/DL (ref 0–1.2)
BUN SERPL-MCNC: 9 MG/DL (ref 6–20)
CALCIUM SERPL-MCNC: 9.4 MG/DL (ref 8.6–10.2)
CHLORIDE SERPL-SCNC: 102 MMOL/L (ref 98–107)
CHOLEST SERPL-MCNC: 227 MG/DL
CK SERPL-CCNC: 162 U/L (ref 20–200)
CO2 SERPL-SCNC: 27 MMOL/L (ref 22–29)
CREAT SERPL-MCNC: 0.9 MG/DL (ref 0.7–1.2)
GFR, ESTIMATED: >90 ML/MIN/1.73M2
GLUCOSE SERPL-MCNC: 88 MG/DL (ref 74–99)
HDLC SERPL-MCNC: 42 MG/DL
LDLC SERPL CALC-MCNC: 149 MG/DL
POTASSIUM SERPL-SCNC: 3.8 MMOL/L (ref 3.5–5)
PROT SERPL-MCNC: 7.9 G/DL (ref 6.4–8.3)
SODIUM SERPL-SCNC: 139 MMOL/L (ref 132–146)
T4 FREE SERPL-MCNC: 1.1 NG/DL (ref 0.9–1.7)
TRIGL SERPL-MCNC: 178 MG/DL
TSH SERPL DL<=0.05 MIU/L-ACNC: 0.85 UIU/ML (ref 0.27–4.2)
VLDLC SERPL CALC-MCNC: 36 MG/DL

## 2024-05-25 PROCEDURE — 82550 ASSAY OF CK (CPK): CPT

## 2024-05-25 PROCEDURE — 84439 ASSAY OF FREE THYROXINE: CPT

## 2024-05-25 PROCEDURE — 84443 ASSAY THYROID STIM HORMONE: CPT

## 2024-05-25 PROCEDURE — 80053 COMPREHEN METABOLIC PANEL: CPT

## 2024-05-25 PROCEDURE — 80061 LIPID PANEL: CPT

## 2024-05-28 ENCOUNTER — OFFICE VISIT (OUTPATIENT)
Dept: PRIMARY CARE CLINIC | Age: 29
End: 2024-05-28
Payer: COMMERCIAL

## 2024-05-28 VITALS
OXYGEN SATURATION: 97 % | HEART RATE: 104 BPM | BODY MASS INDEX: 35.62 KG/M2 | HEIGHT: 72 IN | DIASTOLIC BLOOD PRESSURE: 98 MMHG | RESPIRATION RATE: 18 BRPM | SYSTOLIC BLOOD PRESSURE: 138 MMHG | TEMPERATURE: 97.2 F | WEIGHT: 263 LBS

## 2024-05-28 DIAGNOSIS — E78.2 MIXED HYPERLIPIDEMIA: ICD-10-CM

## 2024-05-28 DIAGNOSIS — G47.33 OSA (OBSTRUCTIVE SLEEP APNEA): ICD-10-CM

## 2024-05-28 DIAGNOSIS — R79.89 ELEVATED LFTS: ICD-10-CM

## 2024-05-28 DIAGNOSIS — I70.1 RENAL ARTERY STENOSIS (HCC): ICD-10-CM

## 2024-05-28 DIAGNOSIS — F34.1 DYSTHYMIA: ICD-10-CM

## 2024-05-28 DIAGNOSIS — I10 ESSENTIAL HYPERTENSION: Primary | ICD-10-CM

## 2024-05-28 DIAGNOSIS — E04.1 THYROID NODULE: ICD-10-CM

## 2024-05-28 PROCEDURE — G8427 DOCREV CUR MEDS BY ELIG CLIN: HCPCS | Performed by: FAMILY MEDICINE

## 2024-05-28 PROCEDURE — 3080F DIAST BP >= 90 MM HG: CPT | Performed by: FAMILY MEDICINE

## 2024-05-28 PROCEDURE — 3075F SYST BP GE 130 - 139MM HG: CPT | Performed by: FAMILY MEDICINE

## 2024-05-28 PROCEDURE — 99214 OFFICE O/P EST MOD 30 MIN: CPT | Performed by: FAMILY MEDICINE

## 2024-05-28 PROCEDURE — 1036F TOBACCO NON-USER: CPT | Performed by: FAMILY MEDICINE

## 2024-05-28 PROCEDURE — G8417 CALC BMI ABV UP PARAM F/U: HCPCS | Performed by: FAMILY MEDICINE

## 2024-05-28 RX ORDER — LISINOPRIL 40 MG/1
40 TABLET ORAL DAILY
Qty: 30 TABLET | Refills: 1 | Status: SHIPPED | OUTPATIENT
Start: 2024-05-28

## 2024-05-28 NOTE — PROGRESS NOTES
or visible regional lymphadenopathy.  Musculoskeletal: no acute joint inflammation.   Skin: Dry and warm with no rash.    Neuro: Alert and oriented. Affect: appropriate. Upper Extremities: 5/5 bilaterally. Lower Extremities:  5/5 bilaterally. Sensation intact to light touch. Reflexes: DTR's are symmetric and 2+ bilaterally. .  Cranial Nerves: Cranial nerves grossly intact.     Office Labs This Visit :  No results found for this visit on 05/28/24.             Assessment and Plan:   Diagnosis Orders   1. Essential hypertension  lisinopril (PRINIVIL;ZESTRIL) 40 MG tablet    Basic Metabolic Panel    Amb External Referral To Nephrology      2. Mixed hyperlipidemia        3. Dysthymia        4. Renal artery stenosis (HCC)        5. PATO (obstructive sleep apnea)        6. Elevated LFTs        7. Thyroid nodule              Hypertension  Not at goal.   Counseled, differential reviewed, counseled on primary versus secondary.  Renal artery ultrasound as above serum catecholamines showed mildly elevated dopamine, saw Dr Velasquez who did not feel clinically relevant, had 24-hour urine which is since came back negative.  furthermore CT abdomen/pelvis negative.   parameters on blood pressure and pulse reviewed and written, notify if out of range, ER if dangerous numbers.  Low-sodium DASH diet/Mediterranean diet emphasized.       Has seen  [facility] cardiovascular medicine Dr. Mosher on 10/20/2023.  He had increased his lisinopril from 10 to 20 mg with the option of increasing to 40 mg.  Consider    PATO should be treated as below    Also on metoprolol 50 mg twice a day plus lisinopril 20 mg daily HCTZ 12.5 mg daily and amlodipine 10 mg daily with standard precautions including EI/RI, bradycardia hypotension angioedema cough leg swelling etc. after discussion and shared decision making increase lisinopril to 40 mg daily with standard precautions, check BMP 5 to 7 days follow-up 3 weeks.  Referred to Dr. Lincoln for

## 2024-06-15 ENCOUNTER — HOSPITAL ENCOUNTER (OUTPATIENT)
Age: 29
Discharge: HOME | End: 2024-06-15
Payer: COMMERCIAL

## 2024-06-15 DIAGNOSIS — I10 ESSENTIAL HYPERTENSION: ICD-10-CM

## 2024-06-15 LAB
BUN SERPL-MCNC: 12 MG/DL (ref 6–20)
CALCIUM SERPL-MCNC: 9.2 MG/DL (ref 8.6–10.2)
CHLORIDE SERPL-SCNC: 102 MMOL/L (ref 98–107)
CO2 SERPL-SCNC: 27 MMOL/L (ref 22–29)
CREAT SERPL-MCNC: 0.9 MG/DL (ref 0.7–1.2)
GFR, ESTIMATED: >90 ML/MIN/1.73M2
GLUCOSE SERPL-MCNC: 92 MG/DL (ref 74–99)
POTASSIUM SERPL-SCNC: 4.2 MMOL/L (ref 3.5–5)
SODIUM SERPL-SCNC: 140 MMOL/L (ref 132–146)

## 2024-06-15 PROCEDURE — 80048 BASIC METABOLIC PNL TOTAL CA: CPT

## 2024-06-18 ENCOUNTER — OFFICE VISIT (OUTPATIENT)
Dept: PRIMARY CARE CLINIC | Age: 29
End: 2024-06-18
Payer: COMMERCIAL

## 2024-06-18 VITALS
OXYGEN SATURATION: 98 % | DIASTOLIC BLOOD PRESSURE: 80 MMHG | TEMPERATURE: 97.3 F | SYSTOLIC BLOOD PRESSURE: 138 MMHG | WEIGHT: 266 LBS | HEART RATE: 98 BPM | BODY MASS INDEX: 36.03 KG/M2 | HEIGHT: 72 IN

## 2024-06-18 DIAGNOSIS — L25.5 RHUS DERMATITIS: ICD-10-CM

## 2024-06-18 DIAGNOSIS — F32.A ANXIETY AND DEPRESSION: ICD-10-CM

## 2024-06-18 DIAGNOSIS — G47.33 OSA (OBSTRUCTIVE SLEEP APNEA): ICD-10-CM

## 2024-06-18 DIAGNOSIS — F34.1 DYSTHYMIA: ICD-10-CM

## 2024-06-18 DIAGNOSIS — R79.89 ELEVATED LFTS: ICD-10-CM

## 2024-06-18 DIAGNOSIS — I10 ESSENTIAL HYPERTENSION: Primary | ICD-10-CM

## 2024-06-18 DIAGNOSIS — E04.1 THYROID NODULE: ICD-10-CM

## 2024-06-18 DIAGNOSIS — E78.2 MIXED HYPERLIPIDEMIA: ICD-10-CM

## 2024-06-18 DIAGNOSIS — I70.1 RENAL ARTERY STENOSIS (HCC): ICD-10-CM

## 2024-06-18 DIAGNOSIS — F41.9 ANXIETY AND DEPRESSION: ICD-10-CM

## 2024-06-18 PROCEDURE — 1036F TOBACCO NON-USER: CPT | Performed by: FAMILY MEDICINE

## 2024-06-18 PROCEDURE — G8427 DOCREV CUR MEDS BY ELIG CLIN: HCPCS | Performed by: FAMILY MEDICINE

## 2024-06-18 PROCEDURE — G8417 CALC BMI ABV UP PARAM F/U: HCPCS | Performed by: FAMILY MEDICINE

## 2024-06-18 PROCEDURE — 3075F SYST BP GE 130 - 139MM HG: CPT | Performed by: FAMILY MEDICINE

## 2024-06-18 PROCEDURE — 99214 OFFICE O/P EST MOD 30 MIN: CPT | Performed by: FAMILY MEDICINE

## 2024-06-18 PROCEDURE — 3079F DIAST BP 80-89 MM HG: CPT | Performed by: FAMILY MEDICINE

## 2024-06-18 RX ORDER — PRAVASTATIN SODIUM 40 MG
40 TABLET ORAL NIGHTLY
Qty: 90 TABLET | Refills: 3 | Status: SHIPPED | OUTPATIENT
Start: 2024-06-18

## 2024-06-18 RX ORDER — LISINOPRIL 40 MG/1
40 TABLET ORAL DAILY
Qty: 90 TABLET | Refills: 3 | Status: SHIPPED | OUTPATIENT
Start: 2024-06-18

## 2024-06-18 RX ORDER — PREDNISONE 10 MG/1
TABLET ORAL
Qty: 21 TABLET | Refills: 0 | Status: SHIPPED | OUTPATIENT
Start: 2024-06-18

## 2024-06-18 NOTE — PROGRESS NOTES
if any, discontinuing new meds if any.  Plan made after discussion and shared decision making.    Await nephrology.  They are to contact sleep medicine to follow-up as importance of treating PATO reviewed.  Continue current management, refill pravastatin and lisinopril.  Prednisone as above.  Otherwise simply with blood work and follow-up 3 months sooner as needed  Return in about 3 months (around 9/18/2024), or if symptoms worsen or fail to improve.         Educational materials and/or home exercises printed for patient's review and were included in patient instructions on his/her After Visit Summary and given to patient at the end of visit.  Follow-up this week, he prefers Thursday or Friday sooner as needed.     After discussion, patient and/or guardian verbalizes understanding, agrees, feels comfortable with and wishes to proceed with above treatment plan. Call for any pending results, FU sooner if abnormal, as needed or if any current symptoms persist/worsen.      Advised patient to call with any new medication issues, and read all Rx info from pharmacy to assure aware of all possible risks and side effects of medication before taking.     Reviewed age and gender appropriate health screening exams and vaccinations.  Advised patient regarding importance of keeping up with recommended health maintenance and to schedule as soon as possible if overdue, as this is important in assessing for undiagnosed pathology, especially cancer, as well as protecting against potentially harmful/life threatening disease.          Patient and/or guardian verbalizes understanding and agrees with above counseling, assessment and plan.     All questions answered.  Information written down        Signs and symptoms to watch for discussed, serious signs and symptoms reviewed.  ER if any.                Jaya Lee MD    Patients are advised to check with insurance company to ensure coverage and to fully understand benefits and cost

## 2024-09-14 ENCOUNTER — HOSPITAL ENCOUNTER (OUTPATIENT)
Age: 29
Discharge: HOME | End: 2024-09-14
Payer: COMMERCIAL

## 2024-09-14 DIAGNOSIS — E04.1 THYROID NODULE: ICD-10-CM

## 2024-09-14 DIAGNOSIS — E78.2 MIXED HYPERLIPIDEMIA: ICD-10-CM

## 2024-09-14 DIAGNOSIS — I10 ESSENTIAL HYPERTENSION: ICD-10-CM

## 2024-09-14 LAB
ALBUMIN SERPL-MCNC: 4.6 G/DL (ref 3.5–5.2)
ALP SERPL-CCNC: 76 U/L (ref 40–129)
ALT SERPL-CCNC: 54 U/L (ref 0–40)
ANION GAP SERPL CALCULATED.3IONS-SCNC: 10 MMOL/L (ref 7–16)
AST SERPL-CCNC: 28 U/L (ref 0–39)
BASOPHILS # BLD: 0.03 K/UL (ref 0–0.2)
BASOPHILS NFR BLD: 0 % (ref 0–2)
BILIRUB SERPL-MCNC: 0.7 MG/DL (ref 0–1.2)
BILIRUB UR QL STRIP: NEGATIVE
BUN SERPL-MCNC: 11 MG/DL (ref 6–20)
CALCIUM SERPL-MCNC: 9.5 MG/DL (ref 8.6–10.2)
CHLORIDE SERPL-SCNC: 101 MMOL/L (ref 98–107)
CHOLEST SERPL-MCNC: 191 MG/DL
CK SERPL-CCNC: 179 U/L (ref 20–200)
CLARITY UR: CLEAR
CO2 SERPL-SCNC: 27 MMOL/L (ref 22–29)
COLOR UR: YELLOW
COMMENT: NORMAL
CREAT SERPL-MCNC: 0.8 MG/DL (ref 0.7–1.2)
EOSINOPHIL # BLD: 0.13 K/UL (ref 0.05–0.5)
EOSINOPHILS RELATIVE PERCENT: 2 % (ref 0–6)
ERYTHROCYTE [DISTWIDTH] IN BLOOD BY AUTOMATED COUNT: 11.8 % (ref 11.5–15)
GFR, ESTIMATED: >90 ML/MIN/1.73M2
GLUCOSE SERPL-MCNC: 103 MG/DL (ref 74–99)
GLUCOSE UR STRIP-MCNC: NEGATIVE MG/DL
HCT VFR BLD AUTO: 45 % (ref 37–54)
HDLC SERPL-MCNC: 44 MG/DL
HGB BLD-MCNC: 15.6 G/DL (ref 12.5–16.5)
HGB UR QL STRIP.AUTO: NEGATIVE
IMM GRANULOCYTES # BLD AUTO: <0.03 K/UL (ref 0–0.58)
IMM GRANULOCYTES NFR BLD: 0 % (ref 0–5)
KETONES UR STRIP-MCNC: NEGATIVE MG/DL
LDLC SERPL CALC-MCNC: 108 MG/DL
LEUKOCYTE ESTERASE UR QL STRIP: NEGATIVE
LYMPHOCYTES NFR BLD: 2.61 K/UL (ref 1.5–4)
LYMPHOCYTES RELATIVE PERCENT: 38 % (ref 20–42)
MCH RBC QN AUTO: 30.4 PG (ref 26–35)
MCHC RBC AUTO-ENTMCNC: 34.7 G/DL (ref 32–34.5)
MCV RBC AUTO: 87.7 FL (ref 80–99.9)
MONOCYTES NFR BLD: 0.62 K/UL (ref 0.1–0.95)
MONOCYTES NFR BLD: 9 % (ref 2–12)
NEUTROPHILS NFR BLD: 50 % (ref 43–80)
NEUTS SEG NFR BLD: 3.45 K/UL (ref 1.8–7.3)
NITRITE UR QL STRIP: NEGATIVE
PH UR STRIP: 7.5 [PH] (ref 5–9)
PLATELET # BLD AUTO: 324 K/UL (ref 130–450)
PMV BLD AUTO: 9.7 FL (ref 7–12)
POTASSIUM SERPL-SCNC: 4.1 MMOL/L (ref 3.5–5)
PROT SERPL-MCNC: 7.6 G/DL (ref 6.4–8.3)
PROT UR STRIP-MCNC: NEGATIVE MG/DL
RBC # BLD AUTO: 5.13 M/UL (ref 3.8–5.8)
SODIUM SERPL-SCNC: 138 MMOL/L (ref 132–146)
SP GR UR STRIP: 1.02 (ref 1–1.03)
T4 FREE SERPL-MCNC: 1.1 NG/DL (ref 0.9–1.7)
TRIGL SERPL-MCNC: 197 MG/DL
TSH SERPL DL<=0.05 MIU/L-ACNC: 0.84 UIU/ML (ref 0.27–4.2)
UROBILINOGEN UR STRIP-ACNC: 0.2 EU/DL (ref 0–1)
VLDLC SERPL CALC-MCNC: 39 MG/DL
WBC OTHER # BLD: 6.9 K/UL (ref 4.5–11.5)

## 2024-09-14 PROCEDURE — 81003 URINALYSIS AUTO W/O SCOPE: CPT

## 2024-09-14 PROCEDURE — 85025 COMPLETE CBC W/AUTO DIFF WBC: CPT

## 2024-09-14 PROCEDURE — 80061 LIPID PANEL: CPT

## 2024-09-14 PROCEDURE — 36415 COLL VENOUS BLD VENIPUNCTURE: CPT

## 2024-09-14 PROCEDURE — 80053 COMPREHEN METABOLIC PANEL: CPT

## 2024-09-14 PROCEDURE — 82550 ASSAY OF CK (CPK): CPT

## 2024-09-14 PROCEDURE — 84439 ASSAY OF FREE THYROXINE: CPT

## 2024-09-14 PROCEDURE — 84443 ASSAY THYROID STIM HORMONE: CPT

## 2024-09-18 ENCOUNTER — OFFICE VISIT (OUTPATIENT)
Dept: PRIMARY CARE CLINIC | Age: 29
End: 2024-09-18
Payer: COMMERCIAL

## 2024-09-18 VITALS
HEART RATE: 97 BPM | WEIGHT: 267 LBS | TEMPERATURE: 98.1 F | DIASTOLIC BLOOD PRESSURE: 70 MMHG | OXYGEN SATURATION: 96 % | BODY MASS INDEX: 36.21 KG/M2 | SYSTOLIC BLOOD PRESSURE: 134 MMHG

## 2024-09-18 DIAGNOSIS — M25.561 CHRONIC PAIN OF BOTH KNEES: ICD-10-CM

## 2024-09-18 DIAGNOSIS — R73.9 HYPERGLYCEMIA: ICD-10-CM

## 2024-09-18 DIAGNOSIS — E04.1 THYROID NODULE: ICD-10-CM

## 2024-09-18 DIAGNOSIS — I70.1 RENAL ARTERY STENOSIS (HCC): ICD-10-CM

## 2024-09-18 DIAGNOSIS — F34.1 DYSTHYMIA: ICD-10-CM

## 2024-09-18 DIAGNOSIS — G89.29 CHRONIC PAIN OF BOTH KNEES: ICD-10-CM

## 2024-09-18 DIAGNOSIS — I10 ESSENTIAL HYPERTENSION: Primary | ICD-10-CM

## 2024-09-18 DIAGNOSIS — E78.2 MIXED HYPERLIPIDEMIA: ICD-10-CM

## 2024-09-18 DIAGNOSIS — M25.562 CHRONIC PAIN OF BOTH KNEES: ICD-10-CM

## 2024-09-18 PROCEDURE — 1036F TOBACCO NON-USER: CPT | Performed by: FAMILY MEDICINE

## 2024-09-18 PROCEDURE — G8427 DOCREV CUR MEDS BY ELIG CLIN: HCPCS | Performed by: FAMILY MEDICINE

## 2024-09-18 PROCEDURE — 99214 OFFICE O/P EST MOD 30 MIN: CPT | Performed by: FAMILY MEDICINE

## 2024-09-18 PROCEDURE — 3078F DIAST BP <80 MM HG: CPT | Performed by: FAMILY MEDICINE

## 2024-09-18 PROCEDURE — G8417 CALC BMI ABV UP PARAM F/U: HCPCS | Performed by: FAMILY MEDICINE

## 2024-09-18 PROCEDURE — 3075F SYST BP GE 130 - 139MM HG: CPT | Performed by: FAMILY MEDICINE

## 2024-09-18 RX ORDER — LISINOPRIL 40 MG/1
40 TABLET ORAL DAILY
Qty: 90 TABLET | Refills: 3 | Status: SHIPPED | OUTPATIENT
Start: 2024-09-18

## 2024-09-18 RX ORDER — PRAVASTATIN SODIUM 40 MG
40 TABLET ORAL NIGHTLY
Qty: 90 TABLET | Refills: 3 | Status: SHIPPED | OUTPATIENT
Start: 2024-09-18

## 2024-12-18 ENCOUNTER — HOSPITAL ENCOUNTER (OUTPATIENT)
Age: 29
Discharge: HOME | End: 2024-12-18
Payer: COMMERCIAL

## 2024-12-18 ENCOUNTER — OFFICE VISIT (OUTPATIENT)
Dept: PRIMARY CARE CLINIC | Age: 29
End: 2024-12-18

## 2024-12-18 VITALS
DIASTOLIC BLOOD PRESSURE: 88 MMHG | HEIGHT: 72 IN | TEMPERATURE: 98 F | BODY MASS INDEX: 35.76 KG/M2 | OXYGEN SATURATION: 97 % | SYSTOLIC BLOOD PRESSURE: 138 MMHG | HEART RATE: 91 BPM | WEIGHT: 264 LBS

## 2024-12-18 DIAGNOSIS — I10 ESSENTIAL HYPERTENSION: ICD-10-CM

## 2024-12-18 DIAGNOSIS — F34.1 DYSTHYMIA: ICD-10-CM

## 2024-12-18 DIAGNOSIS — R73.9 HYPERGLYCEMIA: ICD-10-CM

## 2024-12-18 DIAGNOSIS — E78.2 MIXED HYPERLIPIDEMIA: ICD-10-CM

## 2024-12-18 DIAGNOSIS — F41.9 ANXIETY AND DEPRESSION: ICD-10-CM

## 2024-12-18 DIAGNOSIS — I10 ESSENTIAL HYPERTENSION: Primary | ICD-10-CM

## 2024-12-18 DIAGNOSIS — Z23 ENCOUNTER FOR IMMUNIZATION: ICD-10-CM

## 2024-12-18 DIAGNOSIS — R79.89 ELEVATED LFTS: ICD-10-CM

## 2024-12-18 DIAGNOSIS — I70.1 RENAL ARTERY STENOSIS (HCC): ICD-10-CM

## 2024-12-18 DIAGNOSIS — E04.1 THYROID NODULE: ICD-10-CM

## 2024-12-18 DIAGNOSIS — G47.33 OSA (OBSTRUCTIVE SLEEP APNEA): ICD-10-CM

## 2024-12-18 DIAGNOSIS — F32.A ANXIETY AND DEPRESSION: ICD-10-CM

## 2024-12-18 LAB
ALBUMIN SERPL-MCNC: 4.5 G/DL (ref 3.5–5.2)
ALP SERPL-CCNC: 70 U/L (ref 40–129)
ALT SERPL-CCNC: 78 U/L (ref 0–40)
ANION GAP SERPL CALCULATED.3IONS-SCNC: 9 MMOL/L (ref 7–16)
AST SERPL-CCNC: 38 U/L (ref 0–39)
BILIRUB SERPL-MCNC: 0.4 MG/DL (ref 0–1.2)
BILIRUB UR QL STRIP: NEGATIVE
BUN SERPL-MCNC: 13 MG/DL (ref 6–20)
CALCIUM SERPL-MCNC: 9.5 MG/DL (ref 8.6–10.2)
CHLORIDE SERPL-SCNC: 102 MMOL/L (ref 98–107)
CK SERPL-CCNC: 127 U/L (ref 20–200)
CLARITY UR: CLEAR
CO2 SERPL-SCNC: 26 MMOL/L (ref 22–29)
COLOR UR: YELLOW
CREAT SERPL-MCNC: 0.8 MG/DL (ref 0.7–1.2)
CREAT UR-MCNC: 182.2 MG/DL (ref 40–278)
EPI CELLS #/AREA URNS HPF: NORMAL /HPF
GFR, ESTIMATED: >90 ML/MIN/1.73M2
GLUCOSE SERPL-MCNC: 88 MG/DL (ref 74–99)
GLUCOSE UR STRIP-MCNC: NEGATIVE MG/DL
HBA1C MFR BLD: 5.3 % (ref 4–5.6)
HGB UR QL STRIP.AUTO: NEGATIVE
KETONES UR STRIP-MCNC: NEGATIVE MG/DL
LEUKOCYTE ESTERASE UR QL STRIP: ABNORMAL
MICROALBUMIN UR-MCNC: 12 MG/L (ref 0–19)
MICROALBUMIN/CREAT UR-RTO: 7 MCG/MG CREAT (ref 0–30)
NITRITE UR QL STRIP: NEGATIVE
PH UR STRIP: 6.5 [PH] (ref 5–9)
POTASSIUM SERPL-SCNC: 4 MMOL/L (ref 3.5–5)
PROT SERPL-MCNC: 7.7 G/DL (ref 6.4–8.3)
PROT UR STRIP-MCNC: NEGATIVE MG/DL
RBC #/AREA URNS HPF: NORMAL /HPF
SODIUM SERPL-SCNC: 137 MMOL/L (ref 132–146)
SP GR UR STRIP: 1.02 (ref 1–1.03)
T4 FREE SERPL-MCNC: 1 NG/DL (ref 0.9–1.7)
TSH SERPL DL<=0.05 MIU/L-ACNC: 0.91 UIU/ML (ref 0.27–4.2)
UROBILINOGEN UR STRIP-ACNC: 0.2 EU/DL (ref 0–1)
WBC #/AREA URNS HPF: NORMAL /HPF

## 2024-12-18 PROCEDURE — 81001 URINALYSIS AUTO W/SCOPE: CPT

## 2024-12-18 PROCEDURE — 83036 HEMOGLOBIN GLYCOSYLATED A1C: CPT

## 2024-12-18 PROCEDURE — 82570 ASSAY OF URINE CREATININE: CPT

## 2024-12-18 PROCEDURE — 80053 COMPREHEN METABOLIC PANEL: CPT

## 2024-12-18 PROCEDURE — 36415 COLL VENOUS BLD VENIPUNCTURE: CPT

## 2024-12-18 PROCEDURE — 84439 ASSAY OF FREE THYROXINE: CPT

## 2024-12-18 PROCEDURE — 82550 ASSAY OF CK (CPK): CPT

## 2024-12-18 PROCEDURE — 82043 UR ALBUMIN QUANTITATIVE: CPT

## 2024-12-18 PROCEDURE — 84443 ASSAY THYROID STIM HORMONE: CPT

## 2024-12-18 NOTE — PROGRESS NOTES
or reviews are not being used to generate any component necessary for billing purposes.  Some portions may be carried over for continuity purposes and to aid me with monitoring of past medical conditions and discussions.  Elements necessary for proper CPT code selection are based only on elements of the visit that are truly unique to this visit.

## 2025-03-12 ENCOUNTER — OFFICE VISIT (OUTPATIENT)
Dept: PRIMARY CARE CLINIC | Age: 30
End: 2025-03-12
Payer: COMMERCIAL

## 2025-03-12 VITALS
SYSTOLIC BLOOD PRESSURE: 138 MMHG | OXYGEN SATURATION: 97 % | WEIGHT: 276 LBS | HEART RATE: 85 BPM | TEMPERATURE: 98.6 F | BODY MASS INDEX: 37.38 KG/M2 | HEIGHT: 72 IN | DIASTOLIC BLOOD PRESSURE: 88 MMHG

## 2025-03-12 DIAGNOSIS — F32.A ANXIETY AND DEPRESSION: ICD-10-CM

## 2025-03-12 DIAGNOSIS — E78.2 MIXED HYPERLIPIDEMIA: ICD-10-CM

## 2025-03-12 DIAGNOSIS — K62.5 RECTAL BLEEDING: ICD-10-CM

## 2025-03-12 DIAGNOSIS — I10 ESSENTIAL HYPERTENSION: Primary | ICD-10-CM

## 2025-03-12 DIAGNOSIS — E04.1 THYROID NODULE: ICD-10-CM

## 2025-03-12 DIAGNOSIS — G47.33 OSA (OBSTRUCTIVE SLEEP APNEA): ICD-10-CM

## 2025-03-12 DIAGNOSIS — F41.9 ANXIETY AND DEPRESSION: ICD-10-CM

## 2025-03-12 DIAGNOSIS — R79.89 ELEVATED LFTS: ICD-10-CM

## 2025-03-12 LAB — HGB, POC: 15.3 G/DL

## 2025-03-12 PROCEDURE — 3079F DIAST BP 80-89 MM HG: CPT | Performed by: FAMILY MEDICINE

## 2025-03-12 PROCEDURE — 3075F SYST BP GE 130 - 139MM HG: CPT | Performed by: FAMILY MEDICINE

## 2025-03-12 PROCEDURE — 1036F TOBACCO NON-USER: CPT | Performed by: FAMILY MEDICINE

## 2025-03-12 PROCEDURE — 85018 HEMOGLOBIN: CPT | Performed by: FAMILY MEDICINE

## 2025-03-12 PROCEDURE — G8417 CALC BMI ABV UP PARAM F/U: HCPCS | Performed by: FAMILY MEDICINE

## 2025-03-12 PROCEDURE — G8427 DOCREV CUR MEDS BY ELIG CLIN: HCPCS | Performed by: FAMILY MEDICINE

## 2025-03-12 PROCEDURE — 99214 OFFICE O/P EST MOD 30 MIN: CPT | Performed by: FAMILY MEDICINE

## 2025-03-12 SDOH — ECONOMIC STABILITY: FOOD INSECURITY: WITHIN THE PAST 12 MONTHS, YOU WORRIED THAT YOUR FOOD WOULD RUN OUT BEFORE YOU GOT MONEY TO BUY MORE.: SOMETIMES TRUE

## 2025-03-12 SDOH — ECONOMIC STABILITY: FOOD INSECURITY: WITHIN THE PAST 12 MONTHS, THE FOOD YOU BOUGHT JUST DIDN'T LAST AND YOU DIDN'T HAVE MONEY TO GET MORE.: SOMETIMES TRUE

## 2025-03-12 ASSESSMENT — PATIENT HEALTH QUESTIONNAIRE - PHQ9
SUM OF ALL RESPONSES TO PHQ QUESTIONS 1-9: 0
SUM OF ALL RESPONSES TO PHQ QUESTIONS 1-9: 0
2. FEELING DOWN, DEPRESSED OR HOPELESS: NOT AT ALL
2. FEELING DOWN, DEPRESSED OR HOPELESS: NOT AT ALL
1. LITTLE INTEREST OR PLEASURE IN DOING THINGS: NOT AT ALL
9. THOUGHTS THAT YOU WOULD BE BETTER OFF DEAD, OR OF HURTING YOURSELF: NOT AT ALL
7. TROUBLE CONCENTRATING ON THINGS, SUCH AS READING THE NEWSPAPER OR WATCHING TELEVISION: NOT AT ALL
8. MOVING OR SPEAKING SO SLOWLY THAT OTHER PEOPLE COULD HAVE NOTICED. OR THE OPPOSITE - BEING SO FIDGETY OR RESTLESS THAT YOU HAVE BEEN MOVING AROUND A LOT MORE THAN USUAL: NOT AT ALL
5. POOR APPETITE OR OVEREATING: NOT AT ALL
6. FEELING BAD ABOUT YOURSELF - OR THAT YOU ARE A FAILURE OR HAVE LET YOURSELF OR YOUR FAMILY DOWN: NOT AT ALL
9. THOUGHTS THAT YOU WOULD BE BETTER OFF DEAD, OR OF HURTING YOURSELF: NOT AT ALL
SUM OF ALL RESPONSES TO PHQ QUESTIONS 1-9: 0
10. IF YOU CHECKED OFF ANY PROBLEMS, HOW DIFFICULT HAVE THESE PROBLEMS MADE IT FOR YOU TO DO YOUR WORK, TAKE CARE OF THINGS AT HOME, OR GET ALONG WITH OTHER PEOPLE: NOT DIFFICULT AT ALL
10. IF YOU CHECKED OFF ANY PROBLEMS, HOW DIFFICULT HAVE THESE PROBLEMS MADE IT FOR YOU TO DO YOUR WORK, TAKE CARE OF THINGS AT HOME, OR GET ALONG WITH OTHER PEOPLE: NOT DIFFICULT AT ALL
4. FEELING TIRED OR HAVING LITTLE ENERGY: NOT AT ALL
7. TROUBLE CONCENTRATING ON THINGS, SUCH AS READING THE NEWSPAPER OR WATCHING TELEVISION: NOT AT ALL
3. TROUBLE FALLING OR STAYING ASLEEP: NOT AT ALL
4. FEELING TIRED OR HAVING LITTLE ENERGY: NOT AT ALL
6. FEELING BAD ABOUT YOURSELF - OR THAT YOU ARE A FAILURE OR HAVE LET YOURSELF OR YOUR FAMILY DOWN: NOT AT ALL
5. POOR APPETITE OR OVEREATING: NOT AT ALL
1. LITTLE INTEREST OR PLEASURE IN DOING THINGS: NOT AT ALL
SUM OF ALL RESPONSES TO PHQ QUESTIONS 1-9: 0
3. TROUBLE FALLING OR STAYING ASLEEP: NOT AT ALL
SUM OF ALL RESPONSES TO PHQ QUESTIONS 1-9: 0
8. MOVING OR SPEAKING SO SLOWLY THAT OTHER PEOPLE COULD HAVE NOTICED. OR THE OPPOSITE, BEING SO FIGETY OR RESTLESS THAT YOU HAVE BEEN MOVING AROUND A LOT MORE THAN USUAL: NOT AT ALL

## 2025-03-12 NOTE — PROGRESS NOTES
comfortable with and wishes to proceed with above treatment plan. Call for any pending results, FU sooner if abnormal, as needed or if any current symptoms persist/worsen.      Advised patient to call with any new medication issues, and read all Rx info from pharmacy to assure aware of all possible risks and side effects of medication before taking.     Reviewed age and gender appropriate health screening exams and vaccinations.  Advised patient regarding importance of keeping up with recommended health maintenance and to schedule as soon as possible if overdue, as this is important in assessing for undiagnosed pathology, especially cancer, as well as protecting against potentially harmful/life threatening disease.          Patient and/or guardian verbalizes understanding and agrees with above counseling, assessment and plan.     All questions answered.  Information written down        Signs and symptoms to watch for discussed, serious signs and symptoms reviewed.  ER if any.                Jaya Lee MD    Patients are advised to check with insurance company to ensure coverage and to fully understand benefits and cost prior to any testing.  This note was created with the assistance of voice recognition software.  Document was reviewed however may contain grammatical errors. This note or partial portions of this note may have been created using a copy forward or copy paste feature but these portions have been verified and re-edited for accuracy and any portions not in need of editing or reviews are not being used to generate any component necessary for billing purposes.  Some portions may be carried over for continuity purposes and to aid me with monitoring of past medical conditions and discussions.  Elements necessary for proper CPT code selection are based only on elements of the visit that are truly unique to this visit.

## 2025-03-13 ENCOUNTER — OFFICE VISIT (OUTPATIENT)
Dept: SURGERY | Age: 30
End: 2025-03-13
Payer: COMMERCIAL

## 2025-03-13 VITALS
SYSTOLIC BLOOD PRESSURE: 140 MMHG | OXYGEN SATURATION: 98 % | WEIGHT: 276 LBS | BODY MASS INDEX: 37.38 KG/M2 | DIASTOLIC BLOOD PRESSURE: 84 MMHG | RESPIRATION RATE: 18 BRPM | HEIGHT: 72 IN | HEART RATE: 81 BPM

## 2025-03-13 DIAGNOSIS — K62.5 RECTAL BLEEDING: Primary | ICD-10-CM

## 2025-03-13 PROCEDURE — G8417 CALC BMI ABV UP PARAM F/U: HCPCS | Performed by: SURGERY

## 2025-03-13 PROCEDURE — G8427 DOCREV CUR MEDS BY ELIG CLIN: HCPCS | Performed by: SURGERY

## 2025-03-13 PROCEDURE — 3077F SYST BP >= 140 MM HG: CPT | Performed by: SURGERY

## 2025-03-13 PROCEDURE — 1036F TOBACCO NON-USER: CPT | Performed by: SURGERY

## 2025-03-13 PROCEDURE — 99203 OFFICE O/P NEW LOW 30 MIN: CPT | Performed by: SURGERY

## 2025-03-13 PROCEDURE — 3079F DIAST BP 80-89 MM HG: CPT | Performed by: SURGERY

## 2025-03-17 NOTE — PROGRESS NOTES
be present. Please excuse any transcriptional grammatical or spelling errors that may have escaped my editorial review.

## 2025-03-18 ENCOUNTER — TELEPHONE (OUTPATIENT)
Dept: PRIMARY CARE CLINIC | Age: 30
End: 2025-03-18

## 2025-03-21 ENCOUNTER — RESULTS FOLLOW-UP (OUTPATIENT)
Dept: PRIMARY CARE CLINIC | Age: 30
End: 2025-03-21

## 2025-03-21 NOTE — RESULT ENCOUNTER NOTE
Nodule about the same, possibly increased 0.1 cm.  They are recommending repeat ultrasound 1 year.  Always best to follow with endocrinology as well.

## 2025-03-28 ENCOUNTER — TELEPHONE (OUTPATIENT)
Dept: SURGERY | Age: 30
End: 2025-03-28

## 2025-03-28 ENCOUNTER — PREP FOR PROCEDURE (OUTPATIENT)
Dept: SURGERY | Age: 30
End: 2025-03-28

## 2025-03-28 DIAGNOSIS — K62.5 RECTAL BLEEDING: ICD-10-CM

## 2025-03-28 NOTE — TELEPHONE ENCOUNTER
Anuel Salinasman is scheduled for colonoscopy with hemorrhoid banding with Dr Brooks on 05-16-25 at [facility]. Patient needs to be NPO after midnight the night before procedure. All surgery instructions were explained to the patient and a surgery letter was also mailed out. MA informed patient that PAT will also be calling to review pre-op instructions and medications. Patient verbalized understanding.  Electronically signed by Dorita Robbins MA on 3/28/2025 at 6:25 AM

## 2025-04-18 ENCOUNTER — TELEPHONE (OUTPATIENT)
Dept: SURGERY | Age: 30
End: 2025-04-18

## 2025-04-18 NOTE — TELEPHONE ENCOUNTER
Patient's wife, Valarie, called to cancel his colonoscopy schedued on 5/16/25 due to work.  Office will call to r/s

## 2025-06-13 ENCOUNTER — RESULTS FOLLOW-UP (OUTPATIENT)
Dept: PRIMARY CARE CLINIC | Age: 30
End: 2025-06-13

## 2025-06-13 ENCOUNTER — HOSPITAL ENCOUNTER (OUTPATIENT)
Age: 30
Discharge: HOME | End: 2025-06-13
Payer: COMMERCIAL

## 2025-06-13 DIAGNOSIS — I10 ESSENTIAL HYPERTENSION: ICD-10-CM

## 2025-06-13 DIAGNOSIS — R73.9 HYPERGLYCEMIA: ICD-10-CM

## 2025-06-13 DIAGNOSIS — E78.2 MIXED HYPERLIPIDEMIA: ICD-10-CM

## 2025-06-13 DIAGNOSIS — E04.1 THYROID NODULE: ICD-10-CM

## 2025-06-13 LAB
ALBUMIN SERPL-MCNC: 4.6 G/DL (ref 3.5–5.2)
ALP SERPL-CCNC: 85 U/L (ref 40–129)
ALT SERPL-CCNC: 132 U/L (ref 0–40)
ANION GAP SERPL CALCULATED.3IONS-SCNC: 13 MMOL/L (ref 7–16)
AST SERPL-CCNC: 66 U/L (ref 0–39)
BASOPHILS # BLD: 0.05 K/UL (ref 0–0.2)
BASOPHILS NFR BLD: 1 % (ref 0–2)
BILIRUB SERPL-MCNC: 0.9 MG/DL (ref 0–1.2)
BILIRUB UR QL STRIP: NEGATIVE
BUN SERPL-MCNC: 12 MG/DL (ref 6–20)
CALCIUM SERPL-MCNC: 9.6 MG/DL (ref 8.6–10.2)
CHLORIDE SERPL-SCNC: 99 MMOL/L (ref 98–107)
CHOLEST SERPL-MCNC: 222 MG/DL
CK SERPL-CCNC: 182 U/L (ref 20–200)
CLARITY UR: CLEAR
CO2 SERPL-SCNC: 26 MMOL/L (ref 22–29)
COLOR UR: YELLOW
CREAT SERPL-MCNC: 1 MG/DL (ref 0.7–1.2)
CREAT UR-MCNC: 257 MG/DL (ref 40–278)
EOSINOPHIL # BLD: 0.13 K/UL (ref 0.05–0.5)
EOSINOPHILS RELATIVE PERCENT: 2 % (ref 0–6)
ERYTHROCYTE [DISTWIDTH] IN BLOOD BY AUTOMATED COUNT: 12.2 % (ref 11.5–15)
GFR, ESTIMATED: >90 ML/MIN/1.73M2
GLUCOSE SERPL-MCNC: 88 MG/DL (ref 74–99)
GLUCOSE UR STRIP-MCNC: NEGATIVE MG/DL
HBA1C MFR BLD: 5.5 % (ref 4–5.6)
HCT VFR BLD AUTO: 46.2 % (ref 37–54)
HDLC SERPL-MCNC: 44 MG/DL
HGB BLD-MCNC: 16.3 G/DL (ref 12.5–16.5)
HGB UR QL STRIP.AUTO: NEGATIVE
IMM GRANULOCYTES # BLD AUTO: 0.03 K/UL (ref 0–0.58)
IMM GRANULOCYTES NFR BLD: 0 % (ref 0–5)
KETONES UR STRIP-MCNC: NEGATIVE MG/DL
LDLC SERPL CALC-MCNC: 150 MG/DL
LEUKOCYTE ESTERASE UR QL STRIP: NEGATIVE
LYMPHOCYTES NFR BLD: 2.71 K/UL (ref 1.5–4)
LYMPHOCYTES RELATIVE PERCENT: 30 % (ref 20–42)
MCH RBC QN AUTO: 30.5 PG (ref 26–35)
MCHC RBC AUTO-ENTMCNC: 35.3 G/DL (ref 32–34.5)
MCV RBC AUTO: 86.5 FL (ref 80–99.9)
MICROALBUMIN UR-MCNC: 13 MG/L (ref 0–20)
MICROALBUMIN/CREAT UR-RTO: 5 MCG/MG CREAT (ref 0–30)
MONOCYTES NFR BLD: 0.9 K/UL (ref 0.1–0.95)
MONOCYTES NFR BLD: 10 % (ref 2–12)
NEUTROPHILS NFR BLD: 57 % (ref 43–80)
NEUTS SEG NFR BLD: 5.11 K/UL (ref 1.8–7.3)
NITRITE UR QL STRIP: NEGATIVE
PH UR STRIP: 7.5 [PH] (ref 5–8)
PLATELET # BLD AUTO: 326 K/UL (ref 130–450)
PMV BLD AUTO: 9.9 FL (ref 7–12)
POTASSIUM SERPL-SCNC: 3.9 MMOL/L (ref 3.5–5)
PROT SERPL-MCNC: 8 G/DL (ref 6.4–8.3)
PROT UR STRIP-MCNC: ABNORMAL MG/DL
RBC # BLD AUTO: 5.34 M/UL (ref 3.8–5.8)
RBC #/AREA URNS HPF: NORMAL /HPF
SODIUM SERPL-SCNC: 138 MMOL/L (ref 132–146)
SP GR UR STRIP: 1.01 (ref 1–1.03)
T4 FREE SERPL-MCNC: 1.1 NG/DL (ref 0.9–1.7)
TRIGL SERPL-MCNC: 139 MG/DL
TSH SERPL DL<=0.05 MIU/L-ACNC: 0.81 UIU/ML (ref 0.27–4.2)
UROBILINOGEN UR STRIP-ACNC: 0.2 EU/DL (ref 0–1)
VLDLC SERPL CALC-MCNC: 28 MG/DL
WBC #/AREA URNS HPF: NORMAL /HPF
WBC OTHER # BLD: 8.9 K/UL (ref 4.5–11.5)

## 2025-06-13 PROCEDURE — 83036 HEMOGLOBIN GLYCOSYLATED A1C: CPT

## 2025-06-13 PROCEDURE — 81001 URINALYSIS AUTO W/SCOPE: CPT

## 2025-06-13 PROCEDURE — 82550 ASSAY OF CK (CPK): CPT

## 2025-06-13 PROCEDURE — 82043 UR ALBUMIN QUANTITATIVE: CPT

## 2025-06-13 PROCEDURE — 80061 LIPID PANEL: CPT

## 2025-06-13 PROCEDURE — 84439 ASSAY OF FREE THYROXINE: CPT

## 2025-06-13 PROCEDURE — 84443 ASSAY THYROID STIM HORMONE: CPT

## 2025-06-13 PROCEDURE — 36415 COLL VENOUS BLD VENIPUNCTURE: CPT

## 2025-06-13 PROCEDURE — 82570 ASSAY OF URINE CREATININE: CPT

## 2025-06-13 PROCEDURE — 85025 COMPLETE CBC W/AUTO DIFF WBC: CPT

## 2025-06-13 PROCEDURE — 80053 COMPREHEN METABOLIC PANEL: CPT

## 2025-06-18 ENCOUNTER — OFFICE VISIT (OUTPATIENT)
Dept: PRIMARY CARE CLINIC | Age: 30
End: 2025-06-18
Payer: COMMERCIAL

## 2025-06-18 VITALS
TEMPERATURE: 98 F | DIASTOLIC BLOOD PRESSURE: 88 MMHG | SYSTOLIC BLOOD PRESSURE: 138 MMHG | WEIGHT: 270 LBS | HEIGHT: 72 IN | BODY MASS INDEX: 36.57 KG/M2 | HEART RATE: 92 BPM | OXYGEN SATURATION: 96 %

## 2025-06-18 DIAGNOSIS — R73.9 HYPERGLYCEMIA: ICD-10-CM

## 2025-06-18 DIAGNOSIS — E04.1 THYROID NODULE: ICD-10-CM

## 2025-06-18 DIAGNOSIS — I10 ESSENTIAL HYPERTENSION: Primary | ICD-10-CM

## 2025-06-18 DIAGNOSIS — F32.A ANXIETY AND DEPRESSION: ICD-10-CM

## 2025-06-18 DIAGNOSIS — R79.89 ELEVATED LFTS: ICD-10-CM

## 2025-06-18 DIAGNOSIS — F34.1 DYSTHYMIA: ICD-10-CM

## 2025-06-18 DIAGNOSIS — E78.2 MIXED HYPERLIPIDEMIA: ICD-10-CM

## 2025-06-18 DIAGNOSIS — F41.9 ANXIETY AND DEPRESSION: ICD-10-CM

## 2025-06-18 PROCEDURE — 99214 OFFICE O/P EST MOD 30 MIN: CPT | Performed by: FAMILY MEDICINE

## 2025-06-18 PROCEDURE — G8417 CALC BMI ABV UP PARAM F/U: HCPCS | Performed by: FAMILY MEDICINE

## 2025-06-18 PROCEDURE — 3075F SYST BP GE 130 - 139MM HG: CPT | Performed by: FAMILY MEDICINE

## 2025-06-18 PROCEDURE — 1036F TOBACCO NON-USER: CPT | Performed by: FAMILY MEDICINE

## 2025-06-18 PROCEDURE — 3079F DIAST BP 80-89 MM HG: CPT | Performed by: FAMILY MEDICINE

## 2025-06-18 PROCEDURE — G8427 DOCREV CUR MEDS BY ELIG CLIN: HCPCS | Performed by: FAMILY MEDICINE

## 2025-06-18 NOTE — PROGRESS NOTES
Anuel Rodriguez : 1995 Sex: male  Age: 29 y.o.    Chief Complaint   Patient presents with    Discuss Labs       HPI  HPI:      Presents with his wife, no symptoms.  I am concerned with his numbers.  Counseled and emphasize healthy lifestyle modification diet and exercise.  Unfortunately unable to afford GLP-1.  Eats 2 cans of ravioli's for lunch.  Bleeding resolved so we decided not to follow-up with Dr. Boles.  He was supposed have a colonoscopy but declined now.  Differential reviewed.  Thyroid ultrasound read as TR 4 category nodule recommending follow-up 1 year, there was perhaps a 0.1 cm increase in size, I did recommend he follow with endocrinology to be safe       Review of Systems  ROS:  Const: Denies chills, fever, malaise and sweats.  Eyes: Denies discharge, pain, redness and visual disturbance.  ENMT: Denies earaches, other ear symptoms. Denies nasal or sinus symptoms other than stated  above. Denies mouth and tongue lesions and sore throat.  CV: Denies chest discomfort, pain; diaphoresis, dizziness, edema, lightheadedness, orthopnea,  palpitations, syncope and near syncopal episode or any exertional symptoms  Resp: Denies cough, hemoptysis, pleuritic pain, SOB, sputum production and wheezing.  GI: Denies abdominal pain, change in bowel habits, hematochezia, melena, nausea and vomiting.  : Denies urinary symptoms including dysuria , urgency, frequency or hematuria.  Musculo: No complaints today   skin: Denies bruising, rash  neuro: Denies headache, numbness, stiff neck, tingling and focal weakness slurred speech or facial  droop  Hema/Lymph: Denies bleeding/bruising tendency and enlarged lymph nodes    Blood work shows ALT up to 132 AST up to 66 LDL up to 150 HDL 44 triglyceride 139 TFTs normal CBC Doris normal differential reviewed urinalysis negative microalbumin creatinine ratio-5.  Misses a couple dose of pravastatin a week but for most part taking it he states.    Most Recent

## 2025-07-21 ENCOUNTER — HOSPITAL ENCOUNTER (OUTPATIENT)
Age: 30
Discharge: HOME | End: 2025-07-21
Payer: COMMERCIAL

## 2025-07-21 LAB
ALBUMIN SERPL-MCNC: 4.4 G/DL (ref 3.5–5.2)
ALP SERPL-CCNC: 83 U/L (ref 40–129)
ALT SERPL-CCNC: 78 U/L (ref 0–50)
ANION GAP SERPL CALCULATED.3IONS-SCNC: 13 MMOL/L (ref 7–16)
AST SERPL-CCNC: 39 U/L (ref 0–50)
BILIRUB SERPL-MCNC: 0.7 MG/DL (ref 0–1.2)
BUN SERPL-MCNC: 10 MG/DL (ref 6–20)
CALCIUM SERPL-MCNC: 9.6 MG/DL (ref 8.6–10)
CHLORIDE SERPL-SCNC: 101 MMOL/L (ref 98–107)
CHOLEST SERPL-MCNC: 211 MG/DL
CK SERPL-CCNC: 119 U/L (ref 0–190)
CO2 SERPL-SCNC: 24 MMOL/L (ref 22–29)
CREAT SERPL-MCNC: 0.9 MG/DL (ref 0.7–1.2)
GFR, ESTIMATED: >90 ML/MIN/1.73M2
GLUCOSE SERPL-MCNC: 93 MG/DL (ref 74–99)
HDLC SERPL-MCNC: 40 MG/DL
LDLC SERPL CALC-MCNC: 137 MG/DL
POTASSIUM SERPL-SCNC: 3.9 MMOL/L (ref 3.5–5.1)
PROT SERPL-MCNC: 7.6 G/DL (ref 6.4–8.3)
SODIUM SERPL-SCNC: 138 MMOL/L (ref 136–145)
TRIGL SERPL-MCNC: 168 MG/DL
VLDLC SERPL CALC-MCNC: 34 MG/DL

## 2025-07-21 PROCEDURE — 80053 COMPREHEN METABOLIC PANEL: CPT

## 2025-07-21 PROCEDURE — 82550 ASSAY OF CK (CPK): CPT

## 2025-07-21 PROCEDURE — 36415 COLL VENOUS BLD VENIPUNCTURE: CPT

## 2025-07-21 PROCEDURE — 80061 LIPID PANEL: CPT

## 2025-07-25 ENCOUNTER — OFFICE VISIT (OUTPATIENT)
Dept: PRIMARY CARE CLINIC | Age: 30
End: 2025-07-25
Payer: COMMERCIAL

## 2025-07-25 VITALS
HEART RATE: 90 BPM | HEIGHT: 72 IN | SYSTOLIC BLOOD PRESSURE: 138 MMHG | DIASTOLIC BLOOD PRESSURE: 88 MMHG | OXYGEN SATURATION: 98 % | WEIGHT: 267 LBS | BODY MASS INDEX: 36.16 KG/M2

## 2025-07-25 DIAGNOSIS — F34.1 DYSTHYMIA: ICD-10-CM

## 2025-07-25 DIAGNOSIS — I10 ESSENTIAL HYPERTENSION: ICD-10-CM

## 2025-07-25 DIAGNOSIS — F32.A ANXIETY AND DEPRESSION: Primary | ICD-10-CM

## 2025-07-25 DIAGNOSIS — G47.33 OSA (OBSTRUCTIVE SLEEP APNEA): ICD-10-CM

## 2025-07-25 DIAGNOSIS — R73.9 HYPERGLYCEMIA: ICD-10-CM

## 2025-07-25 DIAGNOSIS — K76.0 FATTY LIVER: ICD-10-CM

## 2025-07-25 DIAGNOSIS — E04.1 THYROID NODULE: ICD-10-CM

## 2025-07-25 DIAGNOSIS — E78.2 MIXED HYPERLIPIDEMIA: ICD-10-CM

## 2025-07-25 DIAGNOSIS — F41.9 ANXIETY AND DEPRESSION: Primary | ICD-10-CM

## 2025-07-25 PROCEDURE — 3075F SYST BP GE 130 - 139MM HG: CPT | Performed by: FAMILY MEDICINE

## 2025-07-25 PROCEDURE — 99214 OFFICE O/P EST MOD 30 MIN: CPT | Performed by: FAMILY MEDICINE

## 2025-07-25 PROCEDURE — G8417 CALC BMI ABV UP PARAM F/U: HCPCS | Performed by: FAMILY MEDICINE

## 2025-07-25 PROCEDURE — 1036F TOBACCO NON-USER: CPT | Performed by: FAMILY MEDICINE

## 2025-07-25 PROCEDURE — 3079F DIAST BP 80-89 MM HG: CPT | Performed by: FAMILY MEDICINE

## 2025-07-25 PROCEDURE — G8427 DOCREV CUR MEDS BY ELIG CLIN: HCPCS | Performed by: FAMILY MEDICINE

## 2025-07-25 NOTE — PROGRESS NOTES
Anuel Rodriguez : 1995 Sex: male  Age: 29 y.o.    Chief Complaint   Patient presents with    Discuss Labs    Hypertension       HPI  HPI:      Presents today with his wife, no symptoms.  He feels well.  He has been eating healthier, active.  Numbers are much better.    Last note \"presents with his wife, no symptoms.  I am concerned with his numbers.  Counseled and emphasize healthy lifestyle modification diet and exercise.  Unfortunately unable to afford GLP-1.  Eats 2 cans of ravioli's for lunch.  Bleeding resolved so we decided not to follow-up with Dr. Boles.  He was supposed have a colonoscopy but declined now.  Differential reviewed.  Thyroid ultrasound read as TR 4 category nodule recommending follow-up 1 year, there was perhaps a 0.1 cm increase in size, I did recommend he follow with endocrinology to be safe\"       Review of Systems  ROS:  Const: Denies chills, fever, malaise and sweats.  Eyes: Denies discharge, pain, redness and visual disturbance.  ENMT: Denies earaches, other ear symptoms. Denies nasal or sinus symptoms other than stated  above. Denies mouth and tongue lesions and sore throat.  CV: Denies chest discomfort, pain; diaphoresis, dizziness, edema, lightheadedness, orthopnea,  palpitations, syncope and near syncopal episode or any exertional symptoms  Resp: Denies cough, hemoptysis, pleuritic pain, SOB, sputum production and wheezing.  GI: Denies abdominal pain, change in bowel habits, hematochezia, melena, nausea and vomiting.  : Denies urinary symptoms including dysuria , urgency, frequency or hematuria.  Musculo: No complaints today   skin: Denies bruising, rash  neuro: Denies headache, numbness, stiff neck, tingling and focal weakness slurred speech or facial  droop  Hema/Lymph: Denies bleeding/bruising tendency and enlarged lymph nodes    Blood work shows BMP normal HDL 40 LDL went from 150-137 triglyceride 168 -78 AST 66-39      Most Recent Labs  CBC  Lab Results